# Patient Record
Sex: MALE | Race: WHITE | URBAN - METROPOLITAN AREA
[De-identification: names, ages, dates, MRNs, and addresses within clinical notes are randomized per-mention and may not be internally consistent; named-entity substitution may affect disease eponyms.]

---

## 2018-12-21 ENCOUNTER — INPATIENT (INPATIENT)
Facility: HOSPITAL | Age: 38
LOS: 2 days | Discharge: HOME | End: 2018-12-24
Attending: INTERNAL MEDICINE | Admitting: INTERNAL MEDICINE
Payer: COMMERCIAL

## 2018-12-21 VITALS
DIASTOLIC BLOOD PRESSURE: 93 MMHG | RESPIRATION RATE: 16 BRPM | HEART RATE: 76 BPM | SYSTOLIC BLOOD PRESSURE: 145 MMHG | OXYGEN SATURATION: 97 % | WEIGHT: 190.04 LBS | TEMPERATURE: 96 F

## 2018-12-21 DIAGNOSIS — Z90.49 ACQUIRED ABSENCE OF OTHER SPECIFIED PARTS OF DIGESTIVE TRACT: Chronic | ICD-10-CM

## 2018-12-21 DIAGNOSIS — K56.609 UNSPECIFIED INTESTINAL OBSTRUCTION, UNSPECIFIED AS TO PARTIAL VERSUS COMPLETE OBSTRUCTION: ICD-10-CM

## 2018-12-21 DIAGNOSIS — K50.90 CROHN'S DISEASE, UNSPECIFIED, WITHOUT COMPLICATIONS: ICD-10-CM

## 2018-12-21 LAB
ALBUMIN SERPL ELPH-MCNC: 4.7 G/DL — SIGNIFICANT CHANGE UP (ref 3.5–5.2)
ALP SERPL-CCNC: 97 U/L — SIGNIFICANT CHANGE UP (ref 30–115)
ALT FLD-CCNC: 27 U/L — SIGNIFICANT CHANGE UP (ref 0–41)
ANION GAP SERPL CALC-SCNC: 17 MMOL/L — HIGH (ref 7–14)
AST SERPL-CCNC: 23 U/L — SIGNIFICANT CHANGE UP (ref 0–41)
BILIRUB DIRECT SERPL-MCNC: <0.2 MG/DL — SIGNIFICANT CHANGE UP (ref 0–0.2)
BILIRUB INDIRECT FLD-MCNC: >0.9 MG/DL — SIGNIFICANT CHANGE UP (ref 0.2–1.2)
BILIRUB SERPL-MCNC: 1.1 MG/DL — SIGNIFICANT CHANGE UP (ref 0.2–1.2)
BUN SERPL-MCNC: 15 MG/DL — SIGNIFICANT CHANGE UP (ref 10–20)
CALCIUM SERPL-MCNC: 10.3 MG/DL — HIGH (ref 8.5–10.1)
CHLORIDE SERPL-SCNC: 98 MMOL/L — SIGNIFICANT CHANGE UP (ref 98–110)
CO2 SERPL-SCNC: 24 MMOL/L — SIGNIFICANT CHANGE UP (ref 17–32)
CREAT SERPL-MCNC: 0.9 MG/DL — SIGNIFICANT CHANGE UP (ref 0.7–1.5)
GLUCOSE SERPL-MCNC: 127 MG/DL — HIGH (ref 70–99)
HCT VFR BLD CALC: 46.8 % — SIGNIFICANT CHANGE UP (ref 42–52)
HGB BLD-MCNC: 16.1 G/DL — SIGNIFICANT CHANGE UP (ref 14–18)
LACTATE SERPL-SCNC: 1.6 MMOL/L — SIGNIFICANT CHANGE UP (ref 0.5–2.2)
LIDOCAIN IGE QN: 19 U/L — SIGNIFICANT CHANGE UP (ref 7–60)
MCHC RBC-ENTMCNC: 29.1 PG — SIGNIFICANT CHANGE UP (ref 27–31)
MCHC RBC-ENTMCNC: 34.4 G/DL — SIGNIFICANT CHANGE UP (ref 32–37)
MCV RBC AUTO: 84.6 FL — SIGNIFICANT CHANGE UP (ref 80–94)
NRBC # BLD: 0 /100 WBCS — SIGNIFICANT CHANGE UP (ref 0–0)
PLATELET # BLD AUTO: 269 K/UL — SIGNIFICANT CHANGE UP (ref 130–400)
POTASSIUM SERPL-MCNC: 4.6 MMOL/L — SIGNIFICANT CHANGE UP (ref 3.5–5)
POTASSIUM SERPL-SCNC: 4.6 MMOL/L — SIGNIFICANT CHANGE UP (ref 3.5–5)
PROT SERPL-MCNC: 7.8 G/DL — SIGNIFICANT CHANGE UP (ref 6–8)
RBC # BLD: 5.53 M/UL — SIGNIFICANT CHANGE UP (ref 4.7–6.1)
RBC # FLD: 12.5 % — SIGNIFICANT CHANGE UP (ref 11.5–14.5)
SODIUM SERPL-SCNC: 139 MMOL/L — SIGNIFICANT CHANGE UP (ref 135–146)
WBC # BLD: 10.42 K/UL — SIGNIFICANT CHANGE UP (ref 4.8–10.8)
WBC # FLD AUTO: 10.42 K/UL — SIGNIFICANT CHANGE UP (ref 4.8–10.8)

## 2018-12-21 RX ORDER — ONDANSETRON 8 MG/1
4 TABLET, FILM COATED ORAL ONCE
Qty: 0 | Refills: 0 | Status: COMPLETED | OUTPATIENT
Start: 2018-12-21 | End: 2018-12-21

## 2018-12-21 RX ORDER — PROCHLORPERAZINE MALEATE 5 MG
10 TABLET ORAL ONCE
Qty: 0 | Refills: 0 | Status: COMPLETED | OUTPATIENT
Start: 2018-12-21 | End: 2018-12-21

## 2018-12-21 RX ORDER — HYDROXYZINE HCL 10 MG
50 TABLET ORAL ONCE
Qty: 0 | Refills: 0 | Status: COMPLETED | OUTPATIENT
Start: 2018-12-21 | End: 2018-12-21

## 2018-12-21 RX ORDER — CHLORHEXIDINE GLUCONATE 213 G/1000ML
1 SOLUTION TOPICAL
Qty: 0 | Refills: 0 | Status: DISCONTINUED | OUTPATIENT
Start: 2018-12-21 | End: 2018-12-24

## 2018-12-21 RX ORDER — IOHEXOL 300 MG/ML
30 INJECTION, SOLUTION INTRAVENOUS ONCE
Qty: 0 | Refills: 0 | Status: COMPLETED | OUTPATIENT
Start: 2018-12-21 | End: 2018-12-21

## 2018-12-21 RX ORDER — MORPHINE SULFATE 50 MG/1
4 CAPSULE, EXTENDED RELEASE ORAL ONCE
Qty: 0 | Refills: 0 | Status: DISCONTINUED | OUTPATIENT
Start: 2018-12-21 | End: 2018-12-21

## 2018-12-21 RX ORDER — MORPHINE SULFATE 50 MG/1
4 CAPSULE, EXTENDED RELEASE ORAL EVERY 6 HOURS
Qty: 0 | Refills: 0 | Status: DISCONTINUED | OUTPATIENT
Start: 2018-12-21 | End: 2018-12-24

## 2018-12-21 RX ORDER — MORPHINE SULFATE 50 MG/1
8 CAPSULE, EXTENDED RELEASE ORAL ONCE
Qty: 0 | Refills: 0 | Status: DISCONTINUED | OUTPATIENT
Start: 2018-12-21 | End: 2018-12-21

## 2018-12-21 RX ORDER — SODIUM CHLORIDE 9 MG/ML
1000 INJECTION INTRAMUSCULAR; INTRAVENOUS; SUBCUTANEOUS ONCE
Qty: 0 | Refills: 0 | Status: COMPLETED | OUTPATIENT
Start: 2018-12-21 | End: 2018-12-21

## 2018-12-21 RX ORDER — HEPARIN SODIUM 5000 [USP'U]/ML
5000 INJECTION INTRAVENOUS; SUBCUTANEOUS EVERY 12 HOURS
Qty: 0 | Refills: 0 | Status: DISCONTINUED | OUTPATIENT
Start: 2018-12-21 | End: 2018-12-24

## 2018-12-21 RX ORDER — LIDOCAINE 4 G/100G
10 CREAM TOPICAL ONCE
Qty: 0 | Refills: 0 | Status: COMPLETED | OUTPATIENT
Start: 2018-12-21 | End: 2018-12-21

## 2018-12-21 RX ORDER — BENZOCAINE 10 %
1 GEL (GRAM) MUCOUS MEMBRANE
Qty: 0 | Refills: 0 | Status: DISCONTINUED | OUTPATIENT
Start: 2018-12-21 | End: 2018-12-24

## 2018-12-21 RX ORDER — SODIUM CHLORIDE 9 MG/ML
1000 INJECTION, SOLUTION INTRAVENOUS ONCE
Qty: 0 | Refills: 0 | Status: COMPLETED | OUTPATIENT
Start: 2018-12-21 | End: 2018-12-21

## 2018-12-21 RX ORDER — SODIUM CHLORIDE 9 MG/ML
1000 INJECTION, SOLUTION INTRAVENOUS
Qty: 0 | Refills: 0 | Status: DISCONTINUED | OUTPATIENT
Start: 2018-12-21 | End: 2018-12-24

## 2018-12-21 RX ADMIN — Medication 60 MILLIGRAM(S): at 11:38

## 2018-12-21 RX ADMIN — Medication 1 SPRAY(S): at 23:37

## 2018-12-21 RX ADMIN — ONDANSETRON 4 MILLIGRAM(S): 8 TABLET, FILM COATED ORAL at 04:34

## 2018-12-21 RX ADMIN — LIDOCAINE 10 MILLILITER(S): 4 CREAM TOPICAL at 09:58

## 2018-12-21 RX ADMIN — Medication 10 MILLIGRAM(S): at 12:03

## 2018-12-21 RX ADMIN — ONDANSETRON 4 MILLIGRAM(S): 8 TABLET, FILM COATED ORAL at 09:58

## 2018-12-21 RX ADMIN — Medication 1 SPRAY(S): at 15:54

## 2018-12-21 RX ADMIN — SODIUM CHLORIDE 100 MILLILITER(S): 9 INJECTION, SOLUTION INTRAVENOUS at 14:53

## 2018-12-21 RX ADMIN — ONDANSETRON 4 MILLIGRAM(S): 8 TABLET, FILM COATED ORAL at 08:15

## 2018-12-21 RX ADMIN — MORPHINE SULFATE 4 MILLIGRAM(S): 50 CAPSULE, EXTENDED RELEASE ORAL at 09:59

## 2018-12-21 RX ADMIN — MORPHINE SULFATE 8 MILLIGRAM(S): 50 CAPSULE, EXTENDED RELEASE ORAL at 06:08

## 2018-12-21 RX ADMIN — SODIUM CHLORIDE 1000 MILLILITER(S): 9 INJECTION INTRAMUSCULAR; INTRAVENOUS; SUBCUTANEOUS at 04:34

## 2018-12-21 RX ADMIN — SODIUM CHLORIDE 1000 MILLILITER(S): 9 INJECTION, SOLUTION INTRAVENOUS at 06:07

## 2018-12-21 RX ADMIN — MORPHINE SULFATE 4 MILLIGRAM(S): 50 CAPSULE, EXTENDED RELEASE ORAL at 04:35

## 2018-12-21 RX ADMIN — MORPHINE SULFATE 4 MILLIGRAM(S): 50 CAPSULE, EXTENDED RELEASE ORAL at 09:58

## 2018-12-21 RX ADMIN — MORPHINE SULFATE 4 MILLIGRAM(S): 50 CAPSULE, EXTENDED RELEASE ORAL at 23:37

## 2018-12-21 RX ADMIN — IOHEXOL 30 MILLILITER(S): 300 INJECTION, SOLUTION INTRAVENOUS at 04:34

## 2018-12-21 RX ADMIN — Medication 50 MILLIGRAM(S): at 22:03

## 2018-12-21 NOTE — ED PROVIDER NOTE - ATTENDING CONTRIBUTION TO CARE
I personally evaluated the patient. I reviewed the Resident’s or Physician Assistant’s note (as assigned above), and agree with the findings and plan except as documented in my note.    39yo M pmhx crohns disease, prior sbo with small bowel resection at [place] presents with mid abd pain and nausea since 3 pm. No BBM. No dysuria or hematuria. No flank pain. No active vomiting.     A/P: labs, CT, pain control, IVF fluids, reassess    Patient will be signed out to Dr. Berrios.

## 2018-12-21 NOTE — H&P ADULT - HISTORY OF PRESENT ILLNESS
37yo M pmhx crohns disease, prior sbo with small bowel resection, all doctors at [place] presents cc middle abdominal pain, described as feeling like his prior obstructions, which began abruptly around 3pm associated with nausea and vomiting. no fevers, no diarrhea.

## 2018-12-21 NOTE — ED PROVIDER NOTE - OBJECTIVE STATEMENT
39yo M pmhx crohns disease, prior sbo with small bowel resection, all doctors at [place] presents cc middle abdominal pain, described as feeling like his prior obstructions, which began abruptly around 3pm associated with nausea and vomiting. no fevers, no diarrhea. 37yo M pmhx crohns disease, prior sbo with small bowel resection, all doctors at [place] presents cc middle abdominal pain, described as feeling like his prior obstructions, which began abruptly around 3pm associated with nausea and vomiting. no fevers, no diarrhea. Patient reports pain as severe, 9/10, sharp in nature, associated with nausea and vomiting, and radiating throughout abdomen.

## 2018-12-21 NOTE — H&P ADULT - NSHPPHYSICALEXAM_GEN_ALL_CORE
Vital Signs Last 24 Hrs  T(C): 36.4 (21 Dec 2018 12:31), Max: 36.4 (21 Dec 2018 11:53)  T(F): 97.5 (21 Dec 2018 12:31), Max: 97.5 (21 Dec 2018 11:53)  HR: 66 (21 Dec 2018 12:31) (66 - 76)  BP: 120/71 (21 Dec 2018 12:31) (120/71 - 145/93)  BP(mean): --  RR: 16 (21 Dec 2018 12:31) (16 - 17)  SpO2: 95% (21 Dec 2018 11:53) (95% - 97%)    PHYSICAL EXAM:  GENERAL: NAD, well-groomed, well-developed  HEAD:  Atraumatic, Normocephalic  EYES: EOMI, PERRLA, conjunctiva and sclera clear  NERVOUS SYSTEM:  Alert & Oriented X 4, Good concentration; Motor Strength 5/5 B/L upper and lower extremities; DTRs 2+ intact and symmetric  CHEST/LUNG: Clear to percussion bilaterally; No rales, rhonchi, wheezing, or rubs  HEART: Regular rate and rhythm; No murmurs, rubs, or gallops  ABDOMEN: Soft, +NGT, decreased bowel sounds, mild TTP  EXTREMITIES:  2+ Peripheral Pulses, No clubbing, cyanosis, or edema  SKIN: No rashes or lesions

## 2018-12-21 NOTE — CONSULT NOTE ADULT - ASSESSMENT
SBO, likely related to Crohn's exacerbation    Pt seen and examined with Dr. Jeffers:   - continue NGT to [facility]; monitor output   - NPO   - IV fluids   - GI consult for possible steroids   - will follow

## 2018-12-21 NOTE — ED ADULT NURSE NOTE - NSIMPLEMENTINTERV_GEN_ALL_ED
Implemented All Universal Safety Interventions:  [place] to call system. Call bell, personal items and telephone within reach. Instruct patient to call for assistance. Room bathroom lighting operational. Non-slip footwear when patient is off stretcher. Physically safe environment: no spills, clutter or unnecessary equipment. Stretcher in lowest position, wheels locked, appropriate side rails in place.

## 2018-12-21 NOTE — ED PROVIDER NOTE - CARE PLAN
Principal Discharge DX:	Crohns disease  Secondary Diagnosis:	Small bowel obstruction Principal Discharge DX:	Small bowel obstruction  Secondary Diagnosis:	Crohn's disease of small intestine with intestinal obstruction  Secondary Diagnosis:	Status post small bowel resection

## 2018-12-21 NOTE — CONSULT NOTE ADULT - ASSESSMENT
38yMale pmh of Crohn's disease on Remicade last dose 6 weeks ago, two prior SBOs with 2 prior Small bowel resections one 21 years ago and one 12 years ago had abdominal pain that started in the left lower quadrant that migrated to the left upper quadrant and pain is now periumbilical. NG tube +300ccs output brown content in one hour    Problem-1 Small Bowel Obstruction   -NG Tube Decompression  -IV Hydration  -Will discuss further with attending    Problem-2 Crohn's Disease   -Patient needs colonoscopy outpatient for surveillance of Crohn's Disease 38yMale pmh of Crohn's disease on Remicade last dose 6 weeks ago, two prior SBOs with 2 prior Small bowel resections one 21 years ago and one 12 years ago had abdominal pain that started in the left lower quadrant that migrated to the left upper quadrant and pain is now periumbilical. NG tube +300ccs output brown content in one hour    Problem-1 Small Bowel Obstruction   -NG Tube Decompression  -IV Hydration      Problem-2 History of Crohn's Disease with possible Flare up  -Methylprednisolone 60mg daily until patient has improvement in current symptoms. Then patient can be switched to Prednisone 40mg with rapid taper for 4 weeks 38yMale pmh of Crohn's disease on Remicade last dose 6 weeks ago, two prior SBOs with 2 prior Small bowel resections one 21 years ago and one 12 years ago had abdominal pain that started in the left lower quadrant that migrated to the left upper quadrant and pain is now periumbilical. NG tube +300ccs output brown content in one hour    Problem-1 Small Bowel Obstruction   -NG Tube Decompression  -IV Hydration  -Daily Abdominal X-Rays      Problem-2 History of Crohn's Disease with possible Flare up  -Methylprednisolone 60mg daily until patient has improvement in current symptoms. Then patient can be switched to Prednisone 40mg with rapid taper for 4 weeks 38yMale pmh of Crohn's disease on Remicade last dose 6 weeks ago, two prior SBOs with 2 prior Small bowel resections one 21 years ago and one 12 years ago had abdominal pain that started in the left lower quadrant that migrated to the left upper quadrant and pain is now periumbilical. NG tube +300ccs output brown content in one hour    Problem-1 Small Bowel Obstruction in a patient with Crohn's and a history of small bowel resection   Inflammatory vs. fibrotic stricture vs. adhesions  -NG Tube Decompression  -IV Hydration  -Daily Abdominal X-Rays  -Methylprednisolone 60mg daily until patient has improvement in current symptoms (normal Xray, reduced NG drainage, no pain, + flatus  + BM) . Then patient can be switched to Prednisone 40mg with  taper over 4 weeks  - If the patient worsens he will be transferred to [facility]

## 2018-12-21 NOTE — ED PROVIDER NOTE - PROGRESS NOTE DETAILS
DW surg PA. OTW to see patient REMI surgery PA and Dr. Skinner. NG tube placed. States no surgical intervention, blockage liekly due to inflammation. REMI Martini Will see patient. signed out to Dr. Ramesh

## 2018-12-21 NOTE — ED PROVIDER NOTE - MEDICAL DECISION MAKING DETAILS
CT scan shows small bowel obstruction.  Surgery consulted. Pain control given and NG tube placed by Dr. Skinner of surgery. ED work up reviewed.  Will treat Crohn's with steroids, fluids. PAtient to be amditted to medicine with GI and surgery evaluations/follow up.  Patient and mother spoken to in detail about results and plan of care.

## 2018-12-21 NOTE — ED PROVIDER NOTE - NS ED ROS FT
General: No fevers, +chills, +nausea, +vomiting  Eyes:  No visual changes, eye pain or discharge.  ENMT:  No hearing changes, pain  Cardiac:  No chest pain,, no edema.  Respiratory:  No cough or respiratory distress.   GI:  + nausea, +vomiting, no diarrhea, +abdominal pain.  :  No dysuria  MS:  No back pain.  Neuro:   No LOC.  Skin:  No skin rash.   Endocrine: No history of thyroid disease or diabetes.

## 2018-12-21 NOTE — H&P ADULT - NSHPLABSRESULTS_GEN_ALL_CORE
16.1   10.42 )-----------( 269      ( 21 Dec 2018 04:05 )             46.8   12-21    139  |  98  |  15  ----------------------------<  127<H>  4.6   |  24  |  0.9    Ca    10.3<H>      21 Dec 2018 04:05    TPro  7.8  /  Alb  4.7  /  TBili  1.1  /  DBili  <0.2  /  AST  23  /  ALT  27  /  AlkPhos  97  12-21      < from: CT Abdomen and Pelvis w/ Oral Cont and w/ IV Cont (12.21.18 @ 07:36) >    IMPRESSION:        Multiple dilated fluid-filled loops of small bowel with evidence of   fecalization measuring up to 4.2 cm within the mid abdomen with apparent   transition point in the right lower quadrant consistent with small bowel   obstruction.    Small abdominopelvic ascites.     < end of copied text >

## 2018-12-21 NOTE — H&P ADULT - ASSESSMENT
37yo M pmhx crohns disease, prior sbo with small bowel resection, all doctors at [place] presents cc middle abdominal pain, described as feeling like his prior obstructions, which began abruptly around 3pm associated with nausea and vomiting. no fevers, no diarrhea.

## 2018-12-21 NOTE — CONSULT NOTE ADULT - SUBJECTIVE AND OBJECTIVE BOX
Chief complaint/Reason for consult: SBO and History of Crohn's Disease    HPI:38yMale pmh of Crohn's disease on Remicade last dose 6 weeks ago, two prior SBOs with 2 prior Small bowel resections one 21 years ago and one 12 years ago had abdominal pain that started in the left lower quadrant that migrated to the left upper quadrant and pain is now periumbilical. Patient states he received morphine prior so he notes he is currently having more abdominal discomfort than abdominal pain. Patient notes he has been having nausea and vomiting since 3pm yesterday as well. 6 episodes of unprompted vomiting and one self induced episode of vomiting due to abdominal discomfort. Patient is on Remicade every 8 weeks and patient's last dose was 6 weeks ago. Patient's last colonoscopy was 8 years ago.     PMHX/PSHX:  PAST MEDICAL & SURGICAL HISTORY:  Crohns disease  Two Small Bowel Resections one 21 years ago and one 12 years ago  Appendectomy 12 years ago  Cholecystectomy 12 years ago    Family history:  FAMILY HISTORY:    No GI cancers in first or second degree relatives    Social History: No smoking. No alcohol. No illegal drug use.    Allergies:  lactose  No Known Drug Allergies      MEDICATIONS:Home Medications:  Remicade:  (21 Dec 2018 04:01)    MEDICATIONS  (STANDING):  prochlorperazine   Injectable 10 milliGRAM(s) IV Push once        REVIEW OF SYSTEMS  General:  No weight loss, fevers, +chills.  Eyes:  No reported pain or visual changes  ENT:  No sore throat or runny nose.  NECK: No stiffness or lymphadenopathy  CV:  No chest pain or palpitations.  Resp:  No shortness of breath, cough, wheezing or hemoptysis  GI:  +Periumbilical abdominal pain, +nausea, +vomiting, No dysphagia, diarrhea, No rectal bleeding, melena, or hematemesis.  Muscle:  No aches or weakness  Neuro:  No tingling, numbness       VITALS:   T(F): 97.5 (12-21-18 @ 11:53), Max: 97.5 (12-21-18 @ 11:53)  HR: 72 (12-21-18 @ 11:53) (72 - 76)  BP: 136/79 (12-21-18 @ 11:53) (127/84 - 145/93)  RR: 17 (12-21-18 @ 11:53) (16 - 17)  SpO2: 95% (12-21-18 @ 11:53) (95% - 97%)    PHYSICAL EXAM:  GENERAL: AAOx3, no acute distress +NG Tube in place produced 300cc output in one hour  HEAD:  Atraumatic, Normocephalic  EYES: conjunctiva and sclera clear  NECK: Supple, No thyromegaly   CHEST/LUNG: Clear to auscultation bilaterally; No wheeze, rhonchi, or rales  HEART: Regular rate and rhythm; normal S1, S2, No murmurs.  ABDOMEN: Soft, nontender, nondistended; Bowel sounds present, no abdominal bruit, masses, or hepatosplenomegaly +surgical abdominal scars noted  EXTREMITIES:  2+ Peripheral Pulses. No clubbing, cyanosis, or edema  NEUROLOGY: No asterixis or tremor  SKIN: Intact, no jaundice  EXTREMITIES: warm, no periph edema.        LABS:  12-21    139  |  98  |  15  ----------------------------<  127<H>  4.6   |  24  |  0.9    Ca    10.3<H>      21 Dec 2018 04:05    TPro  7.8  /  Alb  4.7  /  TBili  1.1  /  DBili  <0.2  /  AST  23  /  ALT  27  /  AlkPhos  97  12-21                          16.1   10.42 )-----------( 269      ( 21 Dec 2018 04:05 )             46.8     LIVER FUNCTIONS - ( 21 Dec 2018 04:05 )  Alb: 4.7 g/dL / Pro: 7.8 g/dL / ALK PHOS: 97 U/L / ALT: 27 U/L / AST: 23 U/L / GGT: x               IMAGING:  < from: CT Abdomen and Pelvis w/ Oral Cont and w/ IV Cont (12.21.18 @ 07:36) >  EXAM:  CT ABDOMEN AND PELVIS OC IC            PROCEDURE DATE:  12/21/2018            INTERPRETATION:  CLINICAL STATEMENT: Abdominal pain. History of Crohn's   disease.      TECHNIQUE: Contiguous axial CT images were obtained from the lower chest   to the pubic symphysis following administration of 95 cc Optiray 320   intravenous contrast. 5 cc discarded.  Oral contrast was administered.    Reformatted images in the coronal and sagittal planes were acquired.    COMPARISON CT: None.      FINDINGS:    LOWER CHEST: Bibasilar subsegmental atelectasis.    HEPATOBILIARY: Few scattered subcentimeter hypodensities too small to   further characterize. The gallbladder is present.    SPLEEN: Unremarkable.    PANCREAS: Unremarkable.    ADRENAL GLANDS: Unremarkable.    KIDNEYS: Symmetric enhancement bilaterally. No hydronephrosis.   Nonobstructing 0.7 x 0.5 x 0.5 cm calculus at the left midpole.    ABDOMINOPELVIC NODES: No lymphadenopathy.    PELVIC ORGANS: Unremarkable.    PERITONEUM/MESENTERY/BOWEL: Multiple dilated fluid-filled loops of small   bowel with evidence of fecalization measuring up to 4.2 cm within the mid   abdomen with apparent transition point in the right lower quadrant   (series 4/279) consistent with small bowel obstruction. The large bowel   is collapsed. Post partial bowel resection with anastomosis seen in the   right lower quadrant. Small abdominopelvic ascites. No evidence of free   air.    BONES/SOFT TISSUES: Unremarkable for age.      IMPRESSION:        Multiple dilated fluid-filled loops of small bowel with evidence of   fecalization measuring up to 4.2 cm within the mid abdomen with apparent   transition point in the right lower quadrant consistent with small bowel   obstruction.    Small abdominopelvic ascites.               SARITA BUENO M.D., RESIDENT RADIOLOGIST  This document has been electronically signed.  ALYSIA PATTON M.D., ATTENDING RADIOLOGIST  This document has been electronically signed. Dec 21 2018  8:55AM    < end of copied text > Chief complaint/Reason for consult: SBO and History of Crohn's Disease    HPI:38yMale pmh of Crohn's disease on Remicade last dose 6 weeks ago, two prior SBOs with 2 prior Small bowel resections one 21 years ago and one 12 years ago had abdominal pain that started in the left lower quadrant that migrated to the left upper quadrant and pain is now periumbilical. Patient states he received morphine prior so he notes he is currently having more abdominal discomfort than abdominal pain. Patient notes he has been having nausea and vomiting since 3pm yesterday as well. 6 episodes of unprompted vomiting and one self induced episode of vomiting due to abdominal discomfort. Patient is on Remicade every 8 weeks and patient's last dose was 6 weeks ago. Patient's last colonoscopy was 8 years ago.     The patient had a small amount of gas and a small bowel movement and has not required morphine for 8 hours    He states that prior episode of small bowel obstruction responded rapidly to steroids      Patient is under the care of Adilia Lomax at [place].     PMHX/PSHX:  PAST MEDICAL & SURGICAL HISTORY:  Crohns disease  Two Small Bowel Resections one 21 years ago and one 12 years ago  Appendectomy 12 years ago  Cholecystectomy 12 years ago    Family history:     No GI cancers in first or second degree relatives    Social History: No smoking. No alcohol. No illegal drug use.    Allergies:  lactose  No Known Drug Allergies      MEDICATIONS:Home Medications:  Remicade:  (21 Dec 2018 04:01)    MEDICATIONS  (STANDING):  prochlorperazine   Injectable 10 milliGRAM(s) IV Push once        REVIEW OF SYSTEMS  General:  No weight loss, fevers, +chills.  Eyes:  No reported pain or visual changes  ENT:  No sore throat or runny nose.  NECK: No stiffness or lymphadenopathy  CV:  No chest pain or palpitations.  Resp:  No shortness of breath, cough, wheezing or hemoptysis  GI:  +Periumbilical abdominal pain, +nausea, +vomiting, No dysphagia, diarrhea, No rectal bleeding, melena, or hematemesis.  Muscle:  No aches or weakness  Neuro:  No tingling, numbness       VITALS:   T(F): 97.5 (12-21-18 @ 11:53), Max: 97.5 (12-21-18 @ 11:53)  HR: 72 (12-21-18 @ 11:53) (72 - 76)  BP: 136/79 (12-21-18 @ 11:53) (127/84 - 145/93)  RR: 17 (12-21-18 @ 11:53) (16 - 17)  SpO2: 95% (12-21-18 @ 11:53) (95% - 97%)    PHYSICAL EXAM:  GENERAL: AAOx3, no acute distress +NG Tube in place produced 300cc output in one hour  HEAD:  Atraumatic, Normocephalic  EYES: conjunctiva and sclera clear  NECK: Supple, No thyromegaly   CHEST/LUNG: Clear to auscultation bilaterally; No wheeze, rhonchi, or rales  HEART: Regular rate and rhythm; normal S1, S2, No murmurs.  ABDOMEN: Soft, nontender, nondistended; Bowel sounds present, no abdominal bruit, masses, or hepatosplenomegaly +surgical abdominal scars noted  EXTREMITIES:  2+ Peripheral Pulses. No clubbing, cyanosis, or edema  NEUROLOGY: No asterixis or tremor  SKIN: Intact, no jaundice  EXTREMITIES: warm, no periph edema.        LABS:  12-21    139  |  98  |  15  ----------------------------<  127<H>  4.6   |  24  |  0.9    Ca    10.3<H>      21 Dec 2018 04:05    TPro  7.8  /  Alb  4.7  /  TBili  1.1  /  DBili  <0.2  /  AST  23  /  ALT  27  /  AlkPhos  97  12-21                          16.1   10.42 )-----------( 269      ( 21 Dec 2018 04:05 )             46.8     LIVER FUNCTIONS - ( 21 Dec 2018 04:05 )  Alb: 4.7 g/dL / Pro: 7.8 g/dL / ALK PHOS: 97 U/L / ALT: 27 U/L / AST: 23 U/L / GGT: x               IMAGING:  < from: CT Abdomen and Pelvis w/ Oral Cont and w/ IV Cont (12.21.18 @ 07:36) >  EXAM:  CT ABDOMEN AND PELVIS OC IC            PROCEDURE DATE:  12/21/2018            INTERPRETATION:  CLINICAL STATEMENT: Abdominal pain. History of Crohn's   disease.      TECHNIQUE: Contiguous axial CT images were obtained from the lower chest   to the pubic symphysis following administration of 95 cc Optiray 320   intravenous contrast. 5 cc discarded.  Oral contrast was administered.    Reformatted images in the coronal and sagittal planes were acquired.    COMPARISON CT: None.      FINDINGS:    LOWER CHEST: Bibasilar subsegmental atelectasis.    HEPATOBILIARY: Few scattered subcentimeter hypodensities too small to   further characterize. The gallbladder is present.    SPLEEN: Unremarkable.    PANCREAS: Unremarkable.    ADRENAL GLANDS: Unremarkable.    KIDNEYS: Symmetric enhancement bilaterally. No hydronephrosis.   Nonobstructing 0.7 x 0.5 x 0.5 cm calculus at the left midpole.    ABDOMINOPELVIC NODES: No lymphadenopathy.    PELVIC ORGANS: Unremarkable.    PERITONEUM/MESENTERY/BOWEL: Multiple dilated fluid-filled loops of small   bowel with evidence of fecalization measuring up to 4.2 cm within the mid   abdomen with apparent transition point in the right lower quadrant   (series 4/279) consistent with small bowel obstruction. The large bowel   is collapsed. Post partial bowel resection with anastomosis seen in the   right lower quadrant. Small abdominopelvic ascites. No evidence of free   air.    BONES/SOFT TISSUES: Unremarkable for age.      IMPRESSION:        Multiple dilated fluid-filled loops of small bowel with evidence of   fecalization measuring up to 4.2 cm within the mid abdomen with apparent   transition point in the right lower quadrant consistent with small bowel   obstruction.    Small abdominopelvic ascites.               SARITA BUENO M.D., RESIDENT RADIOLOGIST  This document has been electronically signed.  ALYSIA PATTON M.D., ATTENDING RADIOLOGIST  This document has been electronically signed. Dec 21 2018  8:55AM    < end of copied text >

## 2018-12-21 NOTE — ED PROVIDER NOTE - PHYSICAL EXAMINATION
CONSTITUTIONAL: Well-developed; well-nourished; appears uncomfortable, speaking in full sentences  SKIN: warm, dry  HEAD: Normocephalic; atraumatic  EYES: PERRL, EOMI, no conjunctival erythema  ENT: No nasal discharge; airway clear, mucous membranes moist  NECK: Supple; non tender, FROM  CARD: +S1, S2 no murmurs, gallops, or rubs. Regular rate and rhythm. radial 2+  RESP: No wheezes, rales or rhonchi. CTABL  ABD: soft, diffusely tender to palpation, no rebound, no guarding, no rigidity, neg murphys  EXT: moves all extremities, ambulates wo assistance No clubbing, cyanosis or edema.   NEURO: Alert, oriented, grossly unremarkable, no focal deficits, cn ii-xii grossly intact  PSYCH: Cooperative, appropriate

## 2018-12-21 NOTE — CONSULT NOTE ADULT - ATTENDING COMMENTS
pt examined  no abdominal pain. no abdominal tenderness. History of Crohn's disease.   History of  Small bowel resection twice before at [facility].   Is on Remicade at [facility].    NG tube inserted over 250ml bilious output.  CT scan reviewed.    Impression: Small bowel obstruction proximal to the staple lines.   Severely inflamed small bowel with proximal fecalization.  All these likely from Crohn's disease.

## 2018-12-21 NOTE — ED PROVIDER NOTE - SECONDARY DIAGNOSIS.
Small bowel obstruction Crohn's disease of small intestine with intestinal obstruction Status post small bowel resection

## 2018-12-22 LAB
ANION GAP SERPL CALC-SCNC: 11 MMOL/L — SIGNIFICANT CHANGE UP (ref 7–14)
BUN SERPL-MCNC: 12 MG/DL — SIGNIFICANT CHANGE UP (ref 10–20)
CALCIUM SERPL-MCNC: 8.7 MG/DL — SIGNIFICANT CHANGE UP (ref 8.5–10.1)
CHLORIDE SERPL-SCNC: 104 MMOL/L — SIGNIFICANT CHANGE UP (ref 98–110)
CO2 SERPL-SCNC: 27 MMOL/L — SIGNIFICANT CHANGE UP (ref 17–32)
CREAT SERPL-MCNC: 0.9 MG/DL — SIGNIFICANT CHANGE UP (ref 0.7–1.5)
GLUCOSE SERPL-MCNC: 126 MG/DL — HIGH (ref 70–99)
HCT VFR BLD CALC: 41.2 % — LOW (ref 42–52)
HGB BLD-MCNC: 13.9 G/DL — LOW (ref 14–18)
MCHC RBC-ENTMCNC: 29.4 PG — SIGNIFICANT CHANGE UP (ref 27–31)
MCHC RBC-ENTMCNC: 33.7 G/DL — SIGNIFICANT CHANGE UP (ref 32–37)
MCV RBC AUTO: 87.3 FL — SIGNIFICANT CHANGE UP (ref 80–94)
NRBC # BLD: 0 /100 WBCS — SIGNIFICANT CHANGE UP (ref 0–0)
PLATELET # BLD AUTO: 232 K/UL — SIGNIFICANT CHANGE UP (ref 130–400)
POTASSIUM SERPL-MCNC: 4.4 MMOL/L — SIGNIFICANT CHANGE UP (ref 3.5–5)
POTASSIUM SERPL-SCNC: 4.4 MMOL/L — SIGNIFICANT CHANGE UP (ref 3.5–5)
RBC # BLD: 4.72 M/UL — SIGNIFICANT CHANGE UP (ref 4.7–6.1)
RBC # FLD: 12.7 % — SIGNIFICANT CHANGE UP (ref 11.5–14.5)
SODIUM SERPL-SCNC: 142 MMOL/L — SIGNIFICANT CHANGE UP (ref 135–146)
WBC # BLD: 9.96 K/UL — SIGNIFICANT CHANGE UP (ref 4.8–10.8)
WBC # FLD AUTO: 9.96 K/UL — SIGNIFICANT CHANGE UP (ref 4.8–10.8)

## 2018-12-22 RX ORDER — HYDROXYZINE HCL 10 MG
25 TABLET ORAL EVERY 6 HOURS
Qty: 0 | Refills: 0 | Status: DISCONTINUED | OUTPATIENT
Start: 2018-12-22 | End: 2018-12-24

## 2018-12-22 RX ORDER — PANTOPRAZOLE SODIUM 20 MG/1
40 TABLET, DELAYED RELEASE ORAL DAILY
Qty: 0 | Refills: 0 | Status: DISCONTINUED | OUTPATIENT
Start: 2018-12-23 | End: 2018-12-24

## 2018-12-22 RX ORDER — HYDROXYZINE HCL 10 MG
50 TABLET ORAL EVERY 8 HOURS
Qty: 0 | Refills: 0 | Status: DISCONTINUED | OUTPATIENT
Start: 2018-12-22 | End: 2018-12-22

## 2018-12-22 RX ORDER — HYDROXYZINE HCL 10 MG
25 TABLET ORAL THREE TIMES A DAY
Qty: 0 | Refills: 0 | Status: DISCONTINUED | OUTPATIENT
Start: 2018-12-22 | End: 2018-12-22

## 2018-12-22 RX ORDER — PANTOPRAZOLE SODIUM 20 MG/1
40 TABLET, DELAYED RELEASE ORAL ONCE
Qty: 0 | Refills: 0 | Status: COMPLETED | OUTPATIENT
Start: 2018-12-22 | End: 2018-12-22

## 2018-12-22 RX ORDER — BENZOCAINE AND MENTHOL 5; 1 G/100ML; G/100ML
1 LIQUID ORAL EVERY 4 HOURS
Qty: 0 | Refills: 0 | Status: DISCONTINUED | OUTPATIENT
Start: 2018-12-22 | End: 2018-12-24

## 2018-12-22 RX ORDER — ONDANSETRON 8 MG/1
4 TABLET, FILM COATED ORAL EVERY 6 HOURS
Qty: 0 | Refills: 0 | Status: DISCONTINUED | OUTPATIENT
Start: 2018-12-22 | End: 2018-12-24

## 2018-12-22 RX ORDER — HYDROXYZINE HCL 10 MG
50 TABLET ORAL AT BEDTIME
Qty: 0 | Refills: 0 | Status: DISCONTINUED | OUTPATIENT
Start: 2018-12-22 | End: 2018-12-24

## 2018-12-22 RX ADMIN — Medication 25 MILLIGRAM(S): at 14:54

## 2018-12-22 RX ADMIN — Medication 60 MILLIGRAM(S): at 06:07

## 2018-12-22 RX ADMIN — PANTOPRAZOLE SODIUM 40 MILLIGRAM(S): 20 TABLET, DELAYED RELEASE ORAL at 17:04

## 2018-12-22 RX ADMIN — ONDANSETRON 4 MILLIGRAM(S): 8 TABLET, FILM COATED ORAL at 14:47

## 2018-12-22 RX ADMIN — Medication 50 MILLIGRAM(S): at 23:04

## 2018-12-22 RX ADMIN — MORPHINE SULFATE 4 MILLIGRAM(S): 50 CAPSULE, EXTENDED RELEASE ORAL at 00:00

## 2018-12-22 RX ADMIN — SODIUM CHLORIDE 100 MILLILITER(S): 9 INJECTION, SOLUTION INTRAVENOUS at 06:09

## 2018-12-22 NOTE — CHART NOTE - NSCHARTNOTEFT_GEN_A_CORE
Spoke with pharmacy: pt started on protonix 2/2 NGt in place/GI stress. Pt on remicade: spoke with pharmacy: no known drug-drug interaction between the two; will continue protonix

## 2018-12-22 NOTE — CHART NOTE - NSCHARTNOTEFT_GEN_A_CORE
Pt states he had some flatus and BM today.  NGT output noted to be a little bloody - will give protonix IV now and then daily.    Continue NGT and F/U obstructive series in AM

## 2018-12-22 NOTE — PROGRESS NOTE ADULT - SUBJECTIVE AND OBJECTIVE BOX
S; Pt with less abdominal pain/distention; no flatus/Bm yet. Has been ambulating. Had 1+ liter output in NGT overnight (denies ice chips/water)  O; Vital Signs Last 24 Hrs  T(C): 36.6 (22 Dec 2018 06:09), Max: 36.8 (21 Dec 2018 22:51)  T(F): 97.8 (22 Dec 2018 06:09), Max: 98.2 (21 Dec 2018 22:51)  HR: 77 (22 Dec 2018 06:09) (66 - 77)  BP: 121/81 (22 Dec 2018 06:09) (120/71 - 136/79)  BP(mean): --  RR: 16 (22 Dec 2018 06:09) (16 - 17)  SpO2: 95% (21 Dec 2018 11:53) (95% - 95%)    I&O's Detail    21 Dec 2018 07:01  -  22 Dec 2018 07:00  --------------------------------------------------------  IN:  Total IN: 0 mL    OUT:    Nasoenteral Tube: 1250 mL (bilious)  Total OUT: 1250 mL    Total NET: -1250 mL          EXAM:  Abd: soft, nontender, no BS appreciated    Labs:                        13.9   9.96  )-----------( 232      ( 22 Dec 2018 08:56 )             41.2         12-22    142  |  104  |  12  ----------------------------<  126<H>  4.4   |  27  |  0.9      Calcium, Total Serum: 8.7 mg/dL (12-22-18 @ 08:56)      RADIOLOGY:    Obs series: pending results (viewed with surgical resident - still with some air fluid levels/dilitation)

## 2018-12-22 NOTE — PROGRESS NOTE ADULT - ASSESSMENT
Patient is a 37yo Male with PMHx Crohn's disease being followed at [facility], on Remicade [id number], last dose 6 weeks ago, prior SBO s/p 2 small bowel resection (21 years ago and one 12 years ago) presented with complaints of Abdominal pain. Pain was described as severe, cramping and sharp & similar to his prior obstructions. It was of sudden onset, initially in the left sided then generalized, associated with nausea and vomiting. He denied any fevers or chills. Last BM and flatus was a day PTP. Patient's last colonoscopy was 8 years ago. CT done showed SBO. He was admitted for further evaluation.         Assessment and Plan:    1. h/o Crohn's disease with SBO:  Obstruction due to Inflammation vs. fibrotic stricture vs. adhesions  CT: Multiple dilated fluid-filled loops of small bowel with evidence of fecalization measuring up to 4.2 cm within the mid abdomen with apparent transition point in the right lower quadrant consistent with small bowel obstruction.  Surgery following: Continue NPO, IV fluids, NGT with suction. Follow up Obstructive series in the AM.  GI following: IV Methylprednisolone 60mg daily until patient has improvement in current symptoms (normal X-ray reduced NG drainage, no pain, + flatus  + BM) .   Then patient can be switched to Prednisone 40mg with taper over 4 weeks  Transfer to [place] if patient fails to improve by tomorrow morning.   Pain control.   Protonix IV.       2. Anxiety:  Continue Vistaril prn.        DVT prophylaxis: Heparin

## 2018-12-22 NOTE — PROGRESS NOTE ADULT - ASSESSMENT
38yMale pmh of Crohn's disease on Remicade last dose 6 weeks ago, two prior SBOs with 2 prior Small bowel resections one 21 years ago and one 12 years ago had abdominal pain that started in the left lower quadrant that migrated to the left upper quadrant and pain is now periumbilical. NG tube output: 1 liter overnight    Problem-1 Small Bowel Obstruction in a patient with Crohn's and a history of small bowel resection   Inflammatory vs. fibrotic stricture vs. adhesions  Patient still seems to be obstructed  -NG Tube Decompression  -IV Hydration  -Daily Abdominal X-Rays (follow up today's film  -Methylprednisolone 60mg daily until patient has improvement in current symptoms (normal Xray, reduced NG drainage, no pain, + flatus  + BM) . Then patient can be switched to Prednisone 40mg with  taper over 4 weeks  - If the patient fails to improve by tomorrow morning, please contact [facility] to arrange for a transfer

## 2018-12-22 NOTE — PROGRESS NOTE ADULT - ASSESSMENT
SBO    1. Continue NGT, NPO, IV fluids  2. may have cepacol lozenges/hard candy for throat irritation  3. F/U obstructive series in AM  4. Continue ambulation

## 2018-12-22 NOTE — PROGRESS NOTE ADULT - SUBJECTIVE AND OBJECTIVE BOX
CHUCHO LYN  38y  Male    Patient is a 38y old  Male who presents with a chief complaint of Abdominal pain (22 Dec 2018 11:30)      INTERVAL HPI/OVERNIGHT EVENTS:  No interval events. Patient still with NG tube on suction.  Abdominal pain and distention improved. Patient complaining of Anxiety and requesting anxiolytic.  No BM or flatus.      REVIEW OF SYSTEMS:  CONSTITUTIONAL: No fever, weight loss, or fatigue  EYES: No eye pain, visual disturbances, or discharge  ENMT:  No difficulty hearing, tinnitus, vertigo; No sinus or throat pain  NECK: No pain or stiffness  BREASTS: No pain, masses, or nipple discharge  RESPIRATORY: No cough, wheezing, chills or hemoptysis; No shortness of breath  CARDIOVASCULAR: No chest pain, palpitations, dizziness, or leg swelling  GASTROINTESTINAL: + abdominal pain. + nausea, No vomiting, or hematemesis; No diarrhea or constipation. No melena or hematochezia.  GENITOURINARY: No dysuria, frequency, hematuria, or incontinence  NEUROLOGICAL: No headaches, memory loss, loss of strength, numbness, or tremors  SKIN: No itching, burning, rashes, or lesions   LYMPH NODES: No enlarged glands  ENDOCRINE: No heat or cold intolerance; No hair loss  MUSCULOSKELETAL: No joint pain or swelling; No muscle, back, or extremity pain  PSYCHIATRIC: No depression, anxiety, mood swings, or difficulty sleeping  HEME/LYMPH: No easy bruising, or bleeding gums  ALLERGY AND IMMUNOLOGIC: No hives or eczema    VITALS:  T(F): 98.8 (12-22-18 @ 14:39), Max: 98.8 (12-22-18 @ 14:39)  HR: 76 (12-22-18 @ 14:39) (66 - 77)  BP: 142/81 (12-22-18 @ 14:39) (121/81 - 142/81)  RR: 16 (12-22-18 @ 14:39) (16 - 16)  SpO2: --        I&O's Summary    21 Dec 2018 07:01  -  22 Dec 2018 07:00  --------------------------------------------------------  IN: 0 mL / OUT: 1250 mL / NET: -1250 mL      PHYSICAL EXAM:  GENERAL: NAD  HEAD:  Atraumatic, Normocephalic  EYES: conjunctiva and sclera clear  ENMT: Moist mucous membranes, NG tube in place  NECK: Supple, Normal thyroid  NERVOUS SYSTEM:  Alert & Oriented X 3, Good concentration; Motor Strength 5/5 B/L upper and lower extremities  CHEST/LUNG: Clear to auscultation bilaterally; No rales, rhonchi, wheezing, or rubs  HEART: Regular rate and rhythm; No murmurs, rubs, or gallops  ABDOMEN: Soft, mild lower abdominal tenderness, Nondistended; Bowel sounds present, well healed incisional scars.  EXTREMITIES:  2+ Peripheral Pulses, No clubbing, cyanosis, or edema  LYMPH: No lymphadenopathy noted  SKIN: No rashes or lesions    Consultant(s) Notes Reviewed:  [x ] YES  [ ] NO  Care Discussed with Consultants/Other Providers [ x] YES  [ ] NO    LABS:                        13.9   9.96  )-----------( 232      ( 22 Dec 2018 08:56 )             41.2     12-22    142  |  104  |  12  ----------------------------<  126<H>  4.4   |  27  |  0.9    Ca    8.7      22 Dec 2018 08:56    TPro  7.8  /  Alb  4.7  /  TBili  1.1  /  DBili  <0.2  /  AST  23  /  ALT  27  /  AlkPhos  97  12-21      MICROBIOLOGY: None      RADIOLOGY & ADDITIONAL TESTS:  < from: Xray Chest 1 View-PORTABLE IMMEDIATE (12.21.18 @ 11:03) >  Support devices: Recently placed enteric tube is in satisfactory   position, terminating overlying the expected region of the stomach in   left upper abdomen    Cardiac/mediastinum/hilum: Unchanged    Lung parenchyma/Pleura: There is no evidence of consolidation, effusion   or pneumothorax.    Skeleton/soft tissues: Stable    Impression:      No radiographic evidence of acute cardiopulmonary disease.      CT Abdomen and Pelvis w/ Oral Cont and w/ IV Cont (12.21.18 @ 07:36)   Multiple dilated fluid-filled loops of small bowel with evidence of   fecalization measuring up to 4.2 cm within the mid abdomen with apparent   transition point in the right lower quadrant consistent with small bowel   obstruction.    Small abdominopelvic ascites.         Imaging Personally Reviewed:  [x] YES  [ ] NO    MEDICATIONS  (STANDING):  chlorhexidine 4% Liquid 1 Application(s) Topical <User Schedule>  dextrose 5% + sodium chloride 0.9%. 1000 milliLiter(s) (100 mL/Hr) IV Continuous <Continuous>  heparin  Injectable 5000 Unit(s) SubCutaneous every 12 hours  methylPREDNISolone sodium succinate Injectable 60 milliGRAM(s) IV Push daily  pantoprazole  Injectable 40 milliGRAM(s) IV Push once    MEDICATIONS  (PRN):  benzocaine 15 mG/menthol 3.6 mG (Sugar-Free) Lozenge 1 Lozenge Oral every 4 hours PRN Sore Throat  benzocaine 20% Spray 1 Spray(s) Topical two times a day PRN throat irritation  hydrOXYzine hydrochloride 25 milliGRAM(s) Oral three times a day PRN Anxiety  morphine  - Injectable 4 milliGRAM(s) IV Push every 6 hours PRN Severe Pain (7 - 10)  ondansetron Injectable 4 milliGRAM(s) IV Push every 6 hours PRN Nausea and/or Vomiting      HEALTH ISSUES - PROBLEM Dx:  Small bowel obstruction  Crohn's disease

## 2018-12-22 NOTE — PROGRESS NOTE ADULT - SUBJECTIVE AND OBJECTIVE BOX
GI Followup Note:   Pt seen and examined at bedside.   38y year old  Male seen  by GI  for : SBO and History of Crohn's Disease        Subjective: 1 liter NGT output overnight. reduced abdominal distention. No flatus or BM since yesterday      REVIEW OF SYSTEMS:  General:  No weight loss, fevers, +chills.  Eyes:  No reported pain or visual changes  ENT:  No sore throat or runny nose.  NECK: No stiffness or lymphadenopathy  CV:  No chest pain or palpitations.  Resp:  No shortness of breath, cough, wheezing or hemoptysis  GI:  +Periumbilical abdominal pain, +nausea, +vomiting, No dysphagia, diarrhea, No rectal bleeding, melena, or hematemesis.  Muscle:  No aches or weakness  Neuro:  No tingling, numbness       Allergies: lactose (Hives)  No Known Drug Allergies      Medications:   benzocaine 15 mG/menthol 3.6 mG (Sugar-Free) Lozenge 1 Lozenge Oral every 4 hours PRN  benzocaine 20% Spray 1 Spray(s) Topical two times a day PRN  chlorhexidine 4% Liquid 1 Application(s) Topical <User Schedule>  dextrose 5% + sodium chloride 0.9%. 1000 milliLiter(s) IV Continuous <Continuous>  heparin  Injectable 5000 Unit(s) SubCutaneous every 12 hours  methylPREDNISolone sodium succinate Injectable 60 milliGRAM(s) IV Push daily  morphine  - Injectable 4 milliGRAM(s) IV Push every 6 hours PRN      PHYSICAL EXAM:    Vital Signs Last 24 Hrs  T(C): 36.6 (22 Dec 2018 06:09), Max: 36.8 (21 Dec 2018 22:51)  T(F): 97.8 (22 Dec 2018 06:09), Max: 98.2 (21 Dec 2018 22:51)  HR: 77 (22 Dec 2018 06:09) (66 - 77)  BP: 121/81 (22 Dec 2018 06:09) (120/71 - 136/79)  BP(mean): --  RR: 16 (22 Dec 2018 06:09) (16 - 17)  SpO2: 95% (21 Dec 2018 11:53) (95% - 95%)    12-21 @ 07:01  -  12-22 @ 07:00  --------------------------------------------------------  IN: 0 mL / OUT: 1250 mL / NET: -1250 mL      GENERAL: AAOx3, no acute distress +NG Tube in place   HEAD:  Atraumatic, Normocephalic  EYES: conjunctiva and sclera clear  NECK: Supple, No thyromegaly   CHEST/LUNG: Clear to auscultation bilaterally; No wheeze, rhonchi, or rales  HEART: Regular rate and rhythm; normal S1, S2, No murmurs.  ABDOMEN: Soft, nontender, nondistended; Bowel sounds present, no abdominal bruit, masses, or hepatosplenomegaly +surgical abdominal scars noted  EXTREMITIES:  2+ Peripheral Pulses. No clubbing, cyanosis, or edema  NEUROLOGY: No asterixis or tremor  SKIN: Intact, no jaundice  EXTREMITIES: warm, no periph edema.      LABS:                        13.9   9.96  )-----------( 232      ( 22 Dec 2018 08:56 )             41.2     12-22    142  |  104  |  12  ----------------------------<  126<H>  4.4   |  27  |  0.9    Ca    8.7      22 Dec 2018 08:56    TPro  7.8  /  Alb  4.7  /  TBili  1.1  /  DBili  <0.2  /  AST  23  /  ALT  27  /  AlkPhos  97  12-21

## 2018-12-23 ENCOUNTER — TRANSCRIPTION ENCOUNTER (OUTPATIENT)
Age: 38
End: 2018-12-23

## 2018-12-23 LAB
ALBUMIN SERPL ELPH-MCNC: 3.7 G/DL — SIGNIFICANT CHANGE UP (ref 3.5–5.2)
ALP SERPL-CCNC: 63 U/L — SIGNIFICANT CHANGE UP (ref 30–115)
ALT FLD-CCNC: 18 U/L — SIGNIFICANT CHANGE UP (ref 0–41)
ANION GAP SERPL CALC-SCNC: 9 MMOL/L — SIGNIFICANT CHANGE UP (ref 7–14)
AST SERPL-CCNC: 12 U/L — SIGNIFICANT CHANGE UP (ref 0–41)
BASOPHILS # BLD AUTO: 0.02 K/UL — SIGNIFICANT CHANGE UP (ref 0–0.2)
BASOPHILS NFR BLD AUTO: 0.2 % — SIGNIFICANT CHANGE UP (ref 0–1)
BILIRUB SERPL-MCNC: 0.7 MG/DL — SIGNIFICANT CHANGE UP (ref 0.2–1.2)
BUN SERPL-MCNC: 16 MG/DL — SIGNIFICANT CHANGE UP (ref 10–20)
CALCIUM SERPL-MCNC: 8.7 MG/DL — SIGNIFICANT CHANGE UP (ref 8.5–10.1)
CHLORIDE SERPL-SCNC: 104 MMOL/L — SIGNIFICANT CHANGE UP (ref 98–110)
CO2 SERPL-SCNC: 30 MMOL/L — SIGNIFICANT CHANGE UP (ref 17–32)
CREAT SERPL-MCNC: 1 MG/DL — SIGNIFICANT CHANGE UP (ref 0.7–1.5)
EOSINOPHIL # BLD AUTO: 0.05 K/UL — SIGNIFICANT CHANGE UP (ref 0–0.7)
EOSINOPHIL NFR BLD AUTO: 0.6 % — SIGNIFICANT CHANGE UP (ref 0–8)
GLUCOSE SERPL-MCNC: 99 MG/DL — SIGNIFICANT CHANGE UP (ref 70–99)
HCT VFR BLD CALC: 38.4 % — LOW (ref 42–52)
HGB BLD-MCNC: 12.5 G/DL — LOW (ref 14–18)
IMM GRANULOCYTES NFR BLD AUTO: 0.2 % — SIGNIFICANT CHANGE UP (ref 0.1–0.3)
LYMPHOCYTES # BLD AUTO: 1.06 K/UL — LOW (ref 1.2–3.4)
LYMPHOCYTES # BLD AUTO: 13.2 % — LOW (ref 20.5–51.1)
MAGNESIUM SERPL-MCNC: 1.9 MG/DL — SIGNIFICANT CHANGE UP (ref 1.8–2.4)
MCHC RBC-ENTMCNC: 28.9 PG — SIGNIFICANT CHANGE UP (ref 27–31)
MCHC RBC-ENTMCNC: 32.6 G/DL — SIGNIFICANT CHANGE UP (ref 32–37)
MCV RBC AUTO: 88.9 FL — SIGNIFICANT CHANGE UP (ref 80–94)
MONOCYTES # BLD AUTO: 0.32 K/UL — SIGNIFICANT CHANGE UP (ref 0.1–0.6)
MONOCYTES NFR BLD AUTO: 4 % — SIGNIFICANT CHANGE UP (ref 1.7–9.3)
NEUTROPHILS # BLD AUTO: 6.54 K/UL — HIGH (ref 1.4–6.5)
NEUTROPHILS NFR BLD AUTO: 81.8 % — HIGH (ref 42.2–75.2)
NRBC # BLD: 0 /100 WBCS — SIGNIFICANT CHANGE UP (ref 0–0)
PHOSPHATE SERPL-MCNC: 1.5 MG/DL — LOW (ref 2.1–4.9)
PLATELET # BLD AUTO: 204 K/UL — SIGNIFICANT CHANGE UP (ref 130–400)
POTASSIUM SERPL-MCNC: 3.9 MMOL/L — SIGNIFICANT CHANGE UP (ref 3.5–5)
POTASSIUM SERPL-SCNC: 3.9 MMOL/L — SIGNIFICANT CHANGE UP (ref 3.5–5)
PROT SERPL-MCNC: 5.8 G/DL — LOW (ref 6–8)
RBC # BLD: 4.32 M/UL — LOW (ref 4.7–6.1)
RBC # FLD: 12.8 % — SIGNIFICANT CHANGE UP (ref 11.5–14.5)
SODIUM SERPL-SCNC: 143 MMOL/L — SIGNIFICANT CHANGE UP (ref 135–146)
WBC # BLD: 8.01 K/UL — SIGNIFICANT CHANGE UP (ref 4.8–10.8)
WBC # FLD AUTO: 8.01 K/UL — SIGNIFICANT CHANGE UP (ref 4.8–10.8)

## 2018-12-23 PROCEDURE — 99232 SBSQ HOSP IP/OBS MODERATE 35: CPT

## 2018-12-23 RX ORDER — PANTOPRAZOLE SODIUM 20 MG/1
1 TABLET, DELAYED RELEASE ORAL
Qty: 30 | Refills: 0 | OUTPATIENT
Start: 2018-12-23 | End: 2019-01-21

## 2018-12-23 RX ADMIN — SODIUM CHLORIDE 100 MILLILITER(S): 9 INJECTION, SOLUTION INTRAVENOUS at 08:42

## 2018-12-23 RX ADMIN — ONDANSETRON 4 MILLIGRAM(S): 8 TABLET, FILM COATED ORAL at 21:22

## 2018-12-23 RX ADMIN — PANTOPRAZOLE SODIUM 40 MILLIGRAM(S): 20 TABLET, DELAYED RELEASE ORAL at 11:23

## 2018-12-23 RX ADMIN — SODIUM CHLORIDE 100 MILLILITER(S): 9 INJECTION, SOLUTION INTRAVENOUS at 17:39

## 2018-12-23 RX ADMIN — Medication 50 MILLIGRAM(S): at 22:06

## 2018-12-23 RX ADMIN — Medication 60 MILLIGRAM(S): at 05:07

## 2018-12-23 NOTE — DISCHARGE NOTE ADULT - OTHER SIGNIFICANT FINDINGS
LABS:                        13.9   9.96  )-----------( 232      ( 22 Dec 2018 08:56 )             41.2     12-22    142  |  104  |  12  ----------------------------<  126<H>  4.4   |  27  |  0.9    Ca    8.7      22 Dec 2018 08:56    TPro  7.8  /  Alb  4.7  /  TBili  1.1  /  DBili  <0.2  /  AST  23  /  ALT  27  /  AlkPhos  97  12-21      MICROBIOLOGY: None      RADIOLOGY & ADDITIONAL TESTS:  X-ray Chest 1 View-PORTABLE IMMEDIATE (12.21.18 @ 11:03)   Support devices: Recently placed enteric tube is in satisfactory   position, terminating overlying the expected region of the stomach in   left upper abdomen    Cardiac/mediastinum/hilum: Unchanged    Lung parenchyma/Pleura: There is no evidence of consolidation, effusion   or pneumothorax.    Skeleton/soft tissues: Stable    Impression:      No radiographic evidence of acute cardiopulmonary disease.    Xray Abdomen Minimum 3 Views (12.23.18 @ 08:53)   Improving small bowel dilatation.      CT Abdomen and Pelvis w/ Oral Cont and w/ IV Cont (12.21.18 @ 07:36)   Multiple dilated fluid-filled loops of small bowel with evidence of   fecalization measuring up to 4.2 cm within the mid abdomen with apparent   transition point in the right lower quadrant consistent with small bowel   obstruction.    Small abdominopelvic ascites.

## 2018-12-23 NOTE — DISCHARGE NOTE ADULT - MEDICATION SUMMARY - MEDICATIONS TO TAKE
I will START or STAY ON the medications listed below when I get home from the hospital:    predniSONE 10 mg oral tablet  -- 4 tab(s) by mouth once a day for 7 days.  Then 3 tabs daily for 7 days.  Then 2 tabs daily for 7 days.  Then 1 tab daily for 7 days.    -- It is very important that you take or use this exactly as directed.  Do not skip doses or discontinue unless directed by your doctor.  Obtain medical advice before taking any non-prescription drugs as some may affect the action of this medication.  Take with food or milk.    -- Indication: For Crohn's disease of small intestine with intestinal obstruction    Remicade  -- Indication: For Crohn's disease of small intestine with intestinal obstruction    Protonix 40 mg oral delayed release tablet  -- 1 tab(s) by mouth once a day   -- It is very important that you take or use this exactly as directed.  Do not skip doses or discontinue unless directed by your doctor.  Obtain medical advice before taking any non-prescription drugs as some may affect the action of this medication.  Swallow whole.  Do not crush.    -- Indication: For GI prophylaxis I will START or STAY ON the medications listed below when I get home from the hospital:    predniSONE 10 mg oral tablet  -- 4 tab(s) by mouth once a day for 7 days.  Then 3 tabs daily for 7 days.  Then 2 tabs daily for 7 days.  Then 1 tab daily for 7 days.    -- It is very important that you take or use this exactly as directed.  Do not skip doses or discontinue unless directed by your doctor.  Obtain medical advice before taking any non-prescription drugs as some may affect the action of this medication.  Take with food or milk.    -- Indication: For Crohn's disease of small intestine with intestinal obstruction    Remicade  -- Indication: For Crohn's disease     Protonix 40 mg oral delayed release tablet  -- 1 tab(s) by mouth once a day   -- It is very important that you take or use this exactly as directed.  Do not skip doses or discontinue unless directed by your doctor.  Obtain medical advice before taking any non-prescription drugs as some may affect the action of this medication.  Swallow whole.  Do not crush.    -- Indication: For GI prohylaxis

## 2018-12-23 NOTE — PROGRESS NOTE ADULT - SUBJECTIVE AND OBJECTIVE BOX
S; Pt continue to pass flatus and had another Bm overnight. NO N/V; still with some abdominal soreness (which he's had with previous resolving SBO)  O; Vital Signs Last 24 Hrs  T(C): 36.7 (23 Dec 2018 05:46), Max: 37.1 (22 Dec 2018 14:39)  T(F): 98.1 (23 Dec 2018 05:46), Max: 98.8 (22 Dec 2018 14:39)  HR: 78 (23 Dec 2018 05:46) (68 - 78)  BP: 119/77 (23 Dec 2018 05:46) (115/66 - 142/81)  BP(mean): --  RR: 16 (23 Dec 2018 05:46) (16 - 16)  SpO2: --    I&O's Detail:    NGT: 600 cc (pt had approx 100 cc ice chips/water, therefore 500 cc gastric output)    EXAM:  abd: soft, ND; mild mid abdominal tenderness, no rebound/guarding; minimal BS    LABS:                        12.5   8.01  )-----------( 204      ( 23 Dec 2018 08:40 )             38.4   12-23    143  |  104  |  16  ----------------------------<  99  3.9   |  30  |  1.0    Ca    8.7      23 Dec 2018 08:40  Phos  1.5     12-23  Mg     1.9     12-23    TPro  5.8<L>  /  Alb  3.7  /  TBili  0.7  /  DBili  x   /  AST  12  /  ALT  18  /  AlkPhos  63  12-23

## 2018-12-23 NOTE — DISCHARGE NOTE ADULT - HOSPITAL COURSE
Patient is a 39yo Male with PMHx Crohn's disease being followed at [facility], on Remicade [id number], last dose 6 weeks ago, prior SBO s/p 2 small bowel resection (21 years ago and one 12 years ago) presented with complaints of Abdominal pain. Pain was described as severe, cramping and sharp & similar to his prior obstructions. It was of sudden onset, initially in the left sided then generalized, associated with nausea and vomiting. He denied any fevers or chills. Last BM and flatus was a day PTP. Patient's last colonoscopy was 8 years ago. CT done showed SBO. He was admitted for further evaluation.         Assessment and Plan:    1. h/o Crohn's disease with SBO:  Obstruction due to Inflammation vs. fibrotic stricture vs. adhesions  CT: Multiple dilated fluid-filled loops of small bowel with evidence of fecalization measuring up to 4.2 cm within the mid abdomen with apparent transition point in the right lower quadrant consistent with small bowel obstruction.  Surgery consulted: NGT placed until patient improved. Serial imaging.   GI following: IV Methylprednisolone 60mg daily until patient has improvement in current symptoms (normal X-ray reduced NG drainage, no pain, + flatus  + BM) .   Then patient can be switched to Prednisone 40mg with taper over 4 weeks  GI prophylaxis.   Patient will follow up with Gastroenterologist at [place].        2. Anxiety:  Continue Vistaril prn. Patient is a 37yo Male with PMHx Crohn's disease being followed at [facility], on Remicade [id number], last dose 6 weeks ago, prior SBO s/p 2 small bowel resection (21 years ago and one 12 years ago) presented with complaints of Abdominal pain. Pain was described as severe, cramping and sharp & similar to his prior obstructions. It was of sudden onset, initially in the left sided then generalized, associated with nausea and vomiting. He denied any fevers or chills. Last BM and flatus was a day PTP. Patient's last colonoscopy was 8 years ago. CT done showed SBO. He was admitted for further evaluation.       Assessment and Plan:    1. h/o Crohn's disease with SBO:  Obstruction due to Inflammation vs. fibrotic stricture vs. adhesions  CT: Multiple dilated fluid-filled loops of small bowel with evidence of fecalization measuring up to 4.2 cm within the mid abdomen with apparent transition point in the right lower quadrant consistent with small bowel obstruction.  Surgery consulted: NGT placed until patient improved. Serial imaging done until SBO resolved.   GI following: IV Methylprednisolone 60mg daily until patient has improvement in current symptoms (normal X-ray reduced NG drainage, no pain, + flatus  + BM) .   Then patient can be switched to Prednisone 40mg with taper over 4 weeks  Continued GI prophylaxis.   Patient stable and tolerating Regular diet.  Patient will follow up with Gastroenterologist at [place].        2. Anxiety:  Started on Vistaril prn.

## 2018-12-23 NOTE — DISCHARGE NOTE ADULT - PLAN OF CARE
Prevent recurrence Complete recommended dose of stroids.  Follow up with Gastroenterologist within 1 week after discharge. Complete recommended dose of steroids.  Follow up with Gastroenterologist within 1 week after discharge.

## 2018-12-23 NOTE — PROGRESS NOTE ADULT - ASSESSMENT
SBO, resolving     - NGT clamped at 8 am -- check residual at noon  - F/U obstructive series - if no SBO and low NGT residual, will D/C NGT and start clears

## 2018-12-23 NOTE — PROGRESS NOTE ADULT - SUBJECTIVE AND OBJECTIVE BOX
CHUCHO LYN  38y  Male    Patient is a 38y old  Male who presents with a chief complaint of Abdominal pain (22 Dec 2018 11:30)      INTERVAL HPI/OVERNIGHT EVENTS:  NGT discontinued.   Abdominal pain and distention Resolved. Patient requesting to be discharged home today.   + BM x 2.       REVIEW OF SYSTEMS:  CONSTITUTIONAL: No fever, weight loss, or fatigue  EYES: No eye pain, visual disturbances, or discharge  ENMT:  No difficulty hearing, tinnitus, vertigo; No sinus or throat pain  NECK: No pain or stiffness  BREASTS: No pain, masses, or nipple discharge  RESPIRATORY: No cough, wheezing, chills or hemoptysis; No shortness of breath  CARDIOVASCULAR: No chest pain, palpitations, dizziness, or leg swelling  GASTROINTESTINAL: No abdominal pain. No nausea, No vomiting, or hematemesis; No diarrhea or constipation. No melena or hematochezia.  GENITOURINARY: No dysuria, frequency, hematuria, or incontinence  NEUROLOGICAL: No headaches, memory loss, loss of strength, numbness, or tremors  SKIN: No itching, burning, rashes, or lesions   LYMPH NODES: No enlarged glands  ENDOCRINE: No heat or cold intolerance; No hair loss  MUSCULOSKELETAL: No joint pain or swelling; No muscle, back, or extremity pain  PSYCHIATRIC: No depression, anxiety, mood swings, or difficulty sleeping  HEME/LYMPH: No easy bruising, or bleeding gums  ALLERGY AND IMMUNOLOGIC: No hives or eczema      VITALS:  T(C): 36.7 (23 Dec 2018 05:46), Max: 37.1 (22 Dec 2018 14:39)  T(F): 98.1 (23 Dec 2018 05:46), Max: 98.8 (22 Dec 2018 14:39)  HR: 78 (23 Dec 2018 05:46) (68 - 78)  BP: 119/77 (23 Dec 2018 05:46) (115/66 - 142/81)  BP(mean): --  RR: 16 (23 Dec 2018 05:46) (16 - 16)  SpO2: --        I&O's Summary    23 Dec 2018 07:01  -  23 Dec 2018 13:06  --------------------------------------------------------  IN: 0 mL / OUT: 0 mL / NET: 0 mL        PHYSICAL EXAM:  GENERAL: NAD  HEAD:  Atraumatic, Normocephalic  EYES: conjunctiva and sclera clear  ENMT: Moist mucous membranes  NECK: Supple, Normal thyroid  NERVOUS SYSTEM:  Alert & Oriented X 3, Good concentration; Motor Strength 5/5 B/L upper and lower extremities  CHEST/LUNG: Clear to auscultation bilaterally; No rales, rhonchi, wheezing, or rubs  HEART: Regular rate and rhythm; No murmurs, rubs, or gallops  ABDOMEN: Soft, mild lower abdominal tenderness, Nondistended; Bowel sounds present, well healed incisional scars.  EXTREMITIES:  2+ Peripheral Pulses, No clubbing, cyanosis, or edema  LYMPH: No lymphadenopathy noted  SKIN: No rashes or lesions    Consultant(s) Notes Reviewed:  [x ] YES  [ ] NO  Care Discussed with Consultants/Other Providers [ x] YES  [ ] NO    LABS:                        13.9   9.96  )-----------( 232      ( 22 Dec 2018 08:56 )             41.2     12-22    142  |  104  |  12  ----------------------------<  126<H>  4.4   |  27  |  0.9    Ca    8.7      22 Dec 2018 08:56    TPro  7.8  /  Alb  4.7  /  TBili  1.1  /  DBili  <0.2  /  AST  23  /  ALT  27  /  AlkPhos  97  12-21      MICROBIOLOGY: None      RADIOLOGY & ADDITIONAL TESTS:  X-ray Chest 1 View-PORTABLE IMMEDIATE (12.21.18 @ 11:03)   Support devices: Recently placed enteric tube is in satisfactory   position, terminating overlying the expected region of the stomach in   left upper abdomen    Cardiac/mediastinum/hilum: Unchanged    Lung parenchyma/Pleura: There is no evidence of consolidation, effusion   or pneumothorax.    Skeleton/soft tissues: Stable    Impression:      No radiographic evidence of acute cardiopulmonary disease.    Xray Abdomen Minimum 3 Views (12.23.18 @ 08:53)   Improving small bowel dilatation.      CT Abdomen and Pelvis w/ Oral Cont and w/ IV Cont (12.21.18 @ 07:36)   Multiple dilated fluid-filled loops of small bowel with evidence of   fecalization measuring up to 4.2 cm within the mid abdomen with apparent   transition point in the right lower quadrant consistent with small bowel   obstruction.    Small abdominopelvic ascites.         Imaging Personally Reviewed:  [x] YES  [ ] NO    MEDICATIONS  (STANDING):  chlorhexidine 4% Liquid 1 Application(s) Topical <User Schedule>  dextrose 5% + sodium chloride 0.9%. 1000 milliLiter(s) (100 mL/Hr) IV Continuous <Continuous>  heparin  Injectable 5000 Unit(s) SubCutaneous every 12 hours  methylPREDNISolone sodium succinate Injectable 60 milliGRAM(s) IV Push daily  pantoprazole  Injectable 40 milliGRAM(s) IV Push daily    MEDICATIONS  (PRN):  benzocaine 15 mG/menthol 3.6 mG (Sugar-Free) Lozenge 1 Lozenge Oral every 4 hours PRN Sore Throat  benzocaine 20% Spray 1 Spray(s) Topical two times a day PRN throat irritation  hydrOXYzine hydrochloride 50 milliGRAM(s) Oral at bedtime PRN sleep  hydrOXYzine hydrochloride Injectable 25 milliGRAM(s) IntraMuscular every 6 hours PRN Anxiety  morphine  - Injectable 4 milliGRAM(s) IV Push every 6 hours PRN Severe Pain (7 - 10)  ondansetron Injectable 4 milliGRAM(s) IV Push every 6 hours PRN Nausea and/or Vomiting        HEALTH ISSUES - PROBLEM Dx:  Small bowel obstruction  Crohn's disease

## 2018-12-23 NOTE — PROGRESS NOTE ADULT - ASSESSMENT
Patient is a 39yo Male with PMHx Crohn's disease being followed at [facility], on Remicade [id number], last dose 6 weeks ago, prior SBO s/p 2 small bowel resection (21 years ago and one 12 years ago) presented with complaints of Abdominal pain. Pain was described as severe, cramping and sharp & similar to his prior obstructions. It was of sudden onset, initially in the left sided then generalized, associated with nausea and vomiting. He denied any fevers or chills. Last BM and flatus was a day PTP. Patient's last colonoscopy was 8 years ago. CT done showed SBO. He was admitted for further evaluation.         Assessment and Plan:    1. h/o Crohn's disease with SBO:  Obstruction due to Inflammation vs. fibrotic stricture vs. adhesions  CT: Multiple dilated fluid-filled loops of small bowel with evidence of fecalization measuring up to 4.2 cm within the mid abdomen with apparent transition point in the right lower quadrant consistent with small bowel obstruction.  Surgery following: Clear liquid diet. NGT discontinued.   GI following: IV Methylprednisolone 60mg daily until patient has improvement in current symptoms (normal X-ray reduced NG drainage, no pain, + flatus  + BM) .   Then patient can be switched to Prednisone 40mg with taper over 4 weeks  GI prophylaxis. .       2. Anxiety:  Continue Vistaril prn.        DVT prophylaxis: Heparin  Disposition: Home when stable.

## 2018-12-23 NOTE — DISCHARGE NOTE ADULT - PATIENT PORTAL LINK FT
You can access the [facility] Patient Portal, offered by [facility], by registering with the following website: [email or web address]

## 2018-12-23 NOTE — DISCHARGE NOTE ADULT - CARE PLAN
Principal Discharge DX:	Crohn's disease of small intestine with intestinal obstruction  Goal:	Prevent recurrence  Assessment and plan of treatment:	Complete recommended dose of stroids.  Follow up with Gastroenterologist within 1 week after discharge. Principal Discharge DX:	Crohn's disease of small intestine with intestinal obstruction  Goal:	Prevent recurrence  Assessment and plan of treatment:	Complete recommended dose of steroids.  Follow up with Gastroenterologist within 1 week after discharge.

## 2018-12-23 NOTE — DISCHARGE NOTE ADULT - CARE PROVIDER_API CALL
Gastroenterologist,   Phone: (   )    -  Fax: (   )    -    PMD,   Phone: (   )    -  Fax: (   )    -

## 2018-12-24 VITALS
HEART RATE: 51 BPM | DIASTOLIC BLOOD PRESSURE: 72 MMHG | TEMPERATURE: 96 F | RESPIRATION RATE: 16 BRPM | SYSTOLIC BLOOD PRESSURE: 108 MMHG

## 2018-12-24 DIAGNOSIS — K50.012 CROHN'S DISEASE OF SMALL INTESTINE WITH INTESTINAL OBSTRUCTION: ICD-10-CM

## 2018-12-24 RX ORDER — PANTOPRAZOLE SODIUM 20 MG/1
1 TABLET, DELAYED RELEASE ORAL
Qty: 30 | Refills: 0
Start: 2018-12-24 | End: 2019-01-22

## 2018-12-24 RX ORDER — SODIUM,POTASSIUM PHOSPHATES 278-250MG
1 POWDER IN PACKET (EA) ORAL
Qty: 15 | Refills: 0
Start: 2018-12-24 | End: 2018-12-28

## 2018-12-24 RX ADMIN — Medication 60 MILLIGRAM(S): at 05:14

## 2018-12-24 NOTE — PROGRESS NOTE ADULT - REASON FOR ADMISSION
Abdominal pain

## 2018-12-24 NOTE — PROGRESS NOTE ADULT - SUBJECTIVE AND OBJECTIVE BOX
38yMale  Being followed for SBO  Interval history: 38yMale pmh of Crohn's disease on Remicade last dose 6 weeks ago, two prior SBOs with 2 prior Small bowel resections one 21 years ago and one 12 years ago had abdominal pain that started in the left lower quadrant that migrated to the left upper quadrant and pain is now periumbilical. NG tube discontinued patient is passing flatus and is able to have bowel movements.      PMHX/PSHX:    PAST MEDICAL & SURGICAL HISTORY:  Crohns disease  Status post small bowel resection      Social History: No smoking. No alcohol. No illegal drug use.          MEDICATIONS:MEDICATIONS  (STANDING):  chlorhexidine 4% Liquid 1 Application(s) Topical <User Schedule>  dextrose 5% + sodium chloride 0.9%. 1000 milliLiter(s) (100 mL/Hr) IV Continuous <Continuous>  heparin  Injectable 5000 Unit(s) SubCutaneous every 12 hours  methylPREDNISolone sodium succinate Injectable 60 milliGRAM(s) IV Push daily  pantoprazole  Injectable 40 milliGRAM(s) IV Push daily    MEDICATIONS  (PRN):  benzocaine 15 mG/menthol 3.6 mG (Sugar-Free) Lozenge 1 Lozenge Oral every 4 hours PRN Sore Throat  benzocaine 20% Spray 1 Spray(s) Topical two times a day PRN throat irritation  hydrOXYzine hydrochloride 50 milliGRAM(s) Oral at bedtime PRN sleep  hydrOXYzine hydrochloride Injectable 25 milliGRAM(s) IntraMuscular every 6 hours PRN Anxiety  morphine  - Injectable 4 milliGRAM(s) IV Push every 6 hours PRN Severe Pain (7 - 10)  ondansetron Injectable 4 milliGRAM(s) IV Push every 6 hours PRN Nausea and/or Vomiting        Allergies:    lactose (Hives)  No Known Drug Allergies    Intolerances          REVIEW OF SYSTEMS:  General:  No weight loss, fevers, or chills.  Eyes:  No reported pain or visual changes  ENT:  No sore throat or runny nose.  NECK: No stiffness   CV:  No chest pain or palpitations.  Resp:  No shortness of breath, cough  GI:  No abdominal pain, nausea, vomiting, dysphagia, diarrhea or constipation. No rectal bleeding, melena, or hematemesis.  Muscle:  No aches or weakness  Neuro:  No tingling, numbness   Heme:  No ecchymosis or easy bruisability        VITAL SIGNS:   T(F): 96.2 (12-24-18 @ 05:28), Max: 98.1 (12-23-18 @ 14:24)  HR: 51 (12-24-18 @ 05:28) (51 - 80)  BP: 108/72 (12-24-18 @ 05:28) (108/72 - 131/82)  RR: 16 (12-24-18 @ 05:28) (16 - 16)  SpO2: --    PHYSICAL EXAM:  GENERAL: AAOx3, no acute distress.  HEAD:  Atraumatic, Normocephalic  EYES: conjunctiva and sclera clear  NECK: Supple, no JVD or thyromegaly  CHEST/LUNG: Clear to auscultation bilaterally; No wheeze, rhonchi, or rales  HEART: Regular rate and rhythm; normal S1, S2, No murmurs.  ABDOMEN: Soft, nontender, nondistended; Bowel sounds present, no abdominal bruit, masses, or hepatosplenomegaly  EXTREMITIES:  2+ Peripheral Pulses. No clubbing, cyanosis, or edema  NEUROLOGY: No asterixis or tremor.   SKIN: Intact, no jaundice  EXTREMITIES: warm, no periph edema.          LABS:                        12.5   8.01  )-----------( 204      ( 23 Dec 2018 08:40 )             38.4     12-23    143  |  104  |  16  ----------------------------<  99  3.9   |  30  |  1.0    Ca    8.7      23 Dec 2018 08:40  Phos  1.5     12-23  Mg     1.9     12-23    TPro  5.8<L>  /  Alb  3.7  /  TBili  0.7  /  DBili  x   /  AST  12  /  ALT  18  /  AlkPhos  63  12-23    LIVER FUNCTIONS - ( 23 Dec 2018 08:40 )  Alb: 3.7 g/dL / Pro: 5.8 g/dL / ALK PHOS: 63 U/L / ALT: 18 U/L / AST: 12 U/L / GGT: x               IMAGING:  < from: Xray Abdomen Minimum 3 Views (12.23.18 @ 08:53) >  EXAM:  XR ABDOMEN MINIMUM 3V            PROCEDURE DATE:  12/23/2018            INTERPRETATION:  Clinical History / Reason for exam: Obstruction.    2 AP views of the abdomen.    Comparison: December 22, 2018.    Findings:    Improved small bowel dilatation. Residual contrast is seen within the   colon no pneumoperitoneum.    Feeding tube in stomach.    Osseous structures demonstrate no acute abnormality.    Impression:    Improving small bowel dilatation.                  ELLIOT LANDAU M.D., ATTENDING RADIOLOGIST  This document has been electronically signed. Dec 23 2018 10:56AM        < end of copied text >

## 2018-12-24 NOTE — PROGRESS NOTE ADULT - SUBJECTIVE AND OBJECTIVE BOX
CHUCHO LYN  38y  Male    Patient is a 38y old  Male who presents with a chief complaint of Abdominal pain (22 Dec 2018 11:30)      INTERVAL HPI/OVERNIGHT EVENTS:  NGT discontinued.   Abdominal pain and distention Resolved. Patient having bowel movement Tolerated CLQ diet.       REVIEW OF SYSTEMS:  CONSTITUTIONAL: No fever, weight loss, or fatigue  EYES: No eye pain, visual disturbances, or discharge  ENMT:  No difficulty hearing, tinnitus, vertigo; No sinus or throat pain  NECK: No pain or stiffness  BREASTS: No pain, masses, or nipple discharge  RESPIRATORY: No cough, wheezing, chills or hemoptysis; No shortness of breath  CARDIOVASCULAR: No chest pain, palpitations, dizziness, or leg swelling  GASTROINTESTINAL: No abdominal pain. No nausea, No vomiting, or hematemesis; No diarrhea or constipation. No melena or hematochezia.  GENITOURINARY: No dysuria, frequency, hematuria, or incontinence  NEUROLOGICAL: No headaches, memory loss, loss of strength, numbness, or tremors  SKIN: No itching, burning, rashes, or lesions   LYMPH NODES: No enlarged glands  ENDOCRINE: No heat or cold intolerance; No hair loss  MUSCULOSKELETAL: No joint pain or swelling; No muscle, back, or extremity pain  PSYCHIATRIC: No depression, anxiety, mood swings, or difficulty sleeping  HEME/LYMPH: No easy bruising, or bleeding gums  ALLERGY AND IMMUNOLOGIC: No hives or eczema      VITALS:  T(C): 35.7 (24 Dec 2018 05:28), Max: 36.7 (23 Dec 2018 14:24)  T(F): 96.2 (24 Dec 2018 05:28), Max: 98.1 (23 Dec 2018 14:24)  HR: 51 (24 Dec 2018 05:28) (51 - 80)  BP: 108/72 (24 Dec 2018 05:28) (108/72 - 131/82)  BP(mean): --  RR: 16 (24 Dec 2018 05:28) (16 - 16)  SpO2: --        I&O's Summary    23 Dec 2018 07:01  -  24 Dec 2018 07:00  --------------------------------------------------------  IN: 0 mL / OUT: 0 mL / NET: 0 mL      PHYSICAL EXAM:  GENERAL: NAD  HEAD:  Atraumatic, Normocephalic  EYES: conjunctiva and sclera clear  ENMT: Moist mucous membranes  NECK: Supple, Normal thyroid  NERVOUS SYSTEM:  Alert & Oriented X 3, Good concentration; Motor Strength 5/5 B/L upper and lower extremities  CHEST/LUNG: Clear to auscultation bilaterally; No rales, rhonchi, wheezing, or rubs  HEART: Regular rate and rhythm; No murmurs, rubs, or gallops  ABDOMEN: Soft, mild lower abdominal tenderness, Nondistended; Bowel sounds present, well healed incisional scars.  EXTREMITIES:  2+ Peripheral Pulses, No clubbing, cyanosis, or edema  LYMPH: No lymphadenopathy noted  SKIN: No rashes or lesions    Consultant(s) Notes Reviewed:  [x ] YES  [ ] NO  Care Discussed with Consultants/Other Providers [ x] YES  [ ] NO    LABS:                                   12.5   8.01  )-----------( 204      ( 23 Dec 2018 08:40 )             38.4     12-23    143  |  104  |  16  ----------------------------<  99  3.9   |  30  |  1.0    Ca    8.7      23 Dec 2018 08:40  Phos  1.5     12-23  Mg     1.9     12-23    TPro  5.8<L>  /  Alb  3.7  /  TBili  0.7  /  DBili  x   /  AST  12  /  ALT  18  /  AlkPhos  63  12-23        MICROBIOLOGY: None      RADIOLOGY & ADDITIONAL TESTS:  X-ray Chest 1 View-PORTABLE IMMEDIATE (12.21.18 @ 11:03)   Support devices: Recently placed enteric tube is in satisfactory   position, terminating overlying the expected region of the stomach in   left upper abdomen    Cardiac/mediastinum/hilum: Unchanged    Lung parenchyma/Pleura: There is no evidence of consolidation, effusion   or pneumothorax.    Skeleton/soft tissues: Stable    Impression:      No radiographic evidence of acute cardiopulmonary disease.    X-ray Abdomen Minimum 3 Views (12.23.18 @ 08:53)   Improving small bowel dilatation.      CT Abdomen and Pelvis w/ Oral Cont and w/ IV Cont (12.21.18 @ 07:36)   Multiple dilated fluid-filled loops of small bowel with evidence of   fecalization measuring up to 4.2 cm within the mid abdomen with apparent   transition point in the right lower quadrant consistent with small bowel   obstruction.    Small abdominopelvic ascites.         Imaging Personally Reviewed:  [x] YES  [ ] NO    MEDICATIONS  (STANDING):  chlorhexidine 4% Liquid 1 Application(s) Topical <User Schedule>  dextrose 5% + sodium chloride 0.9%. 1000 milliLiter(s) (100 mL/Hr) IV Continuous <Continuous>  heparin  Injectable 5000 Unit(s) SubCutaneous every 12 hours  methylPREDNISolone sodium succinate Injectable 60 milliGRAM(s) IV Push daily  pantoprazole  Injectable 40 milliGRAM(s) IV Push daily    MEDICATIONS  (PRN):  benzocaine 15 mG/menthol 3.6 mG (Sugar-Free) Lozenge 1 Lozenge Oral every 4 hours PRN Sore Throat  benzocaine 20% Spray 1 Spray(s) Topical two times a day PRN throat irritation  hydrOXYzine hydrochloride 50 milliGRAM(s) Oral at bedtime PRN sleep  hydrOXYzine hydrochloride Injectable 25 milliGRAM(s) IntraMuscular every 6 hours PRN Anxiety  morphine  - Injectable 4 milliGRAM(s) IV Push every 6 hours PRN Severe Pain (7 - 10)  ondansetron Injectable 4 milliGRAM(s) IV Push every 6 hours PRN Nausea and/or Vomiting          HEALTH ISSUES - PROBLEM Dx:  Small bowel obstruction  Crohn's disease

## 2018-12-24 NOTE — PROGRESS NOTE ADULT - ASSESSMENT
Patient is a 37yo Male with PMHx Crohn's disease being followed at [facility], on Remicade [id number], last dose 6 weeks ago, prior SBO s/p 2 small bowel resection (21 years ago and one 12 years ago) presented with complaints of Abdominal pain. Pain was described as severe, cramping and sharp & similar to his prior obstructions. It was of sudden onset, initially in the left sided then generalized, associated with nausea and vomiting. He denied any fevers or chills. Last BM and flatus was a day PTP. Patient's last colonoscopy was 8 years ago. CT done showed SBO. He was admitted for further evaluation.         Assessment and Plan:    1. h/o Crohn's disease with SBO:  Obstruction due to Inflammation vs. fibrotic stricture vs. adhesions  CT: Multiple dilated fluid-filled loops of small bowel with evidence of fecalization measuring up to 4.2 cm within the mid abdomen with apparent transition point in the right lower quadrant consistent with small bowel obstruction.  Surgery following: Started on Regular diet.    GI following: IV Methylprednisolone 60mg daily until patient has improvement in current symptoms (normal X-ray reduced NG drainage, no pain, + flatus  + BM) .   Then patient can be switched to Prednisone 40mg with taper over 4 weeks  GI prophylaxis. Discharge home if tolerates regular diet.      2. Anxiety:  Continue Vistaril prn.        DVT prophylaxis: Heparin  Disposition: Home when stable. Patient is a 37yo Male with PMHx Crohn's disease being followed at [facility], on Remicade [id number], last dose 6 weeks ago, prior SBO s/p 2 small bowel resection (21 years ago and one 12 years ago) presented with complaints of Abdominal pain. Pain was described as severe, cramping and sharp & similar to his prior obstructions. It was of sudden onset, initially in the left sided then generalized, associated with nausea and vomiting. He denied any fevers or chills. Last BM and flatus was a day PTP. Patient's last colonoscopy was 8 years ago. CT done showed SBO. He was admitted for further evaluation.         Assessment and Plan:    1. h/o Crohn's disease with SBO:  Obstruction due to Inflammation vs. fibrotic stricture vs. adhesions  CT: Multiple dilated fluid-filled loops of small bowel with evidence of fecalization measuring up to 4.2 cm within the mid abdomen with apparent transition point in the right lower quadrant consistent with small bowel obstruction.  Surgery following: Started on Regular diet.    GI following: IV Methylprednisolone 60mg daily until patient has improvement in current symptoms (normal X-ray reduced NG drainage, no pain, + flatus  + BM) .   Then patient can be switched to Prednisone 40mg with taper over 4 weeks  GI prophylaxis. Discharge home if tolerates regular diet.      2. Anxiety:  Continue Vistaril prn.        3. Hypophosphatemia:  Replaced. Monitor Phos level.        DVT prophylaxis: Heparin  Disposition: Home when stable.

## 2018-12-24 NOTE — PROGRESS NOTE ADULT - ASSESSMENT
38yMale pmh of Crohn's disease on Remicade last dose 6 weeks ago, two prior SBOs with 2 prior Small bowel resections one 21 years ago and one 12 years ago had abdominal pain that started in the left lower quadrant that migrated to the left upper quadrant and pain is now periumbilical. NG tube discontinued patient is passing flatus and is able to have bowel movements    Problem-1 Small Bowel Obstruction much improved in a patient with Crohn's and a history of small bowel resection   Inflammatory vs. fibrotic stricture vs. adhesions  -Methylprednisolone 60mg daily until patient has improvement in current symptoms (normal Xray, reduced NG drainage, no pain, + flatus  + BM) . Then patient can be switched to Prednisone 40mg with  taper over 4 weeks  -Patient will also follow up with Dr. Camp at [address] [facility] [phone] upon Discharge

## 2018-12-24 NOTE — PROGRESS NOTE ADULT - SUBJECTIVE AND OBJECTIVE BOX
S; Pt continue to pass flatus and had another Bm overnight. NO N/V; still with some abdominal soreness     TOLERATED CLEARS    O;  Vital Signs Last 24 Hrs  T(C): 35.7 (24 Dec 2018 05:28), Max: 36.7 (23 Dec 2018 14:24)  T(F): 96.2 (24 Dec 2018 05:28), Max: 98.1 (23 Dec 2018 14:24)  HR: 51 (24 Dec 2018 05:28) (51 - 80)  BP: 108/72 (24 Dec 2018 05:28) (108/72 - 131/82)  BP(mean): --  RR: 16 (24 Dec 2018 05:28) (16 - 16)        EXAM:  abd: soft, ND; mild mid abdominal tenderness, no rebound/guarding;  BS +

## 2018-12-28 DIAGNOSIS — Z98.890 OTHER SPECIFIED POSTPROCEDURAL STATES: ICD-10-CM

## 2018-12-28 DIAGNOSIS — Z90.49 ACQUIRED ABSENCE OF OTHER SPECIFIED PARTS OF DIGESTIVE TRACT: ICD-10-CM

## 2018-12-28 DIAGNOSIS — F41.9 ANXIETY DISORDER, UNSPECIFIED: ICD-10-CM

## 2018-12-28 DIAGNOSIS — K56.609 UNSPECIFIED INTESTINAL OBSTRUCTION, UNSPECIFIED AS TO PARTIAL VERSUS COMPLETE OBSTRUCTION: ICD-10-CM

## 2018-12-28 DIAGNOSIS — E83.39 OTHER DISORDERS OF PHOSPHORUS METABOLISM: ICD-10-CM

## 2018-12-28 DIAGNOSIS — K50.012 CROHN'S DISEASE OF SMALL INTESTINE WITH INTESTINAL OBSTRUCTION: ICD-10-CM

## 2019-01-11 ENCOUNTER — EMERGENCY (EMERGENCY)
Facility: HOSPITAL | Age: 39
LOS: 0 days | Discharge: HOME | End: 2019-01-11
Attending: EMERGENCY MEDICINE | Admitting: EMERGENCY MEDICINE
Payer: COMMERCIAL

## 2019-01-11 VITALS
HEART RATE: 81 BPM | SYSTOLIC BLOOD PRESSURE: 151 MMHG | DIASTOLIC BLOOD PRESSURE: 88 MMHG | OXYGEN SATURATION: 99 % | RESPIRATION RATE: 16 BRPM | WEIGHT: 182.1 LBS | HEIGHT: 70 IN | TEMPERATURE: 98 F

## 2019-01-11 DIAGNOSIS — I80.8 PHLEBITIS AND THROMBOPHLEBITIS OF OTHER SITES: ICD-10-CM

## 2019-01-11 DIAGNOSIS — Z79.899 OTHER LONG TERM (CURRENT) DRUG THERAPY: ICD-10-CM

## 2019-01-11 DIAGNOSIS — Z91.011 ALLERGY TO MILK PRODUCTS: ICD-10-CM

## 2019-01-11 DIAGNOSIS — Z98.890 OTHER SPECIFIED POSTPROCEDURAL STATES: ICD-10-CM

## 2019-01-11 DIAGNOSIS — Z87.19 PERSONAL HISTORY OF OTHER DISEASES OF THE DIGESTIVE SYSTEM: ICD-10-CM

## 2019-01-11 DIAGNOSIS — Z79.52 LONG TERM (CURRENT) USE OF SYSTEMIC STEROIDS: ICD-10-CM

## 2019-01-11 DIAGNOSIS — M79.601 PAIN IN RIGHT ARM: ICD-10-CM

## 2019-01-11 DIAGNOSIS — M79.89 OTHER SPECIFIED SOFT TISSUE DISORDERS: ICD-10-CM

## 2019-01-11 DIAGNOSIS — Z90.49 ACQUIRED ABSENCE OF OTHER SPECIFIED PARTS OF DIGESTIVE TRACT: Chronic | ICD-10-CM

## 2019-01-11 PROCEDURE — 93971 EXTREMITY STUDY: CPT | Mod: 26

## 2019-01-11 NOTE — ED PROVIDER NOTE - ATTENDING CONTRIBUTION TO CARE
I personally evaluated the patient. I reviewed the Resident’s or Physician Assistant’s note (as assigned above), and agree with the findings and plan except as documented in my note.    37 y/o M w hx of Crohn's sent in by PMD for RUE US for r/o DVT. Patient was recently admitted to the hospital for SBO and had an IV placed in that extr. Patient was discharged beginning of January and noticed pain along distal vein. Has been taking NSAIDs w some relief.     Plan: RUQ US, reassess

## 2019-01-11 NOTE — ED ADULT NURSE NOTE - NSIMPLEMENTINTERV_GEN_ALL_ED
Implemented All Universal Safety Interventions:  [place] to call system. Call bell, personal items and telephone within reach. Instruct patient to call for assistance. Room bathroom lighting operational. Non-slip footwear when patient is off stretcher. Physically safe environment: no spills, clutter or unnecessary equipment. Stretcher in lowest position, wheels locked, appropriate side rails in place.

## 2019-01-11 NOTE — ED PROVIDER NOTE - MEDICAL DECISION MAKING DETAILS
Patient has plebitis of the distal vein. No DVT evident. Recommend NSAIDs, wamr compresses and vascular outpatient follow-up. Patient agreed w the plan I have full discussed the medical management and delivery of care with the patient. Patient confirms understanding and has been given detailed return precautions. Patient instructed to return to the ED should symptoms persist or worsen. Patient is well appearing upon discharge.

## 2019-01-11 NOTE — ED PROVIDER NOTE - PHYSICAL EXAMINATION
CONSTITUTIONAL: Well-appearing; well-nourished; in no apparent distress.   CARDIOVASCULAR: Normal S1, S2; no murmurs, rubs, or gallops.   RESPIRATORY: Normal chest excursion with respiration; breath sounds clear and equal bilaterally; no wheezes, rhonchi, or rales.  GI/: Normal bowel sounds; non-distended; non-tender; no palpable organomegaly.   MS: + tenderness to right AC and forearm vein c/w superficial thrombophlebitis. 2+ radial pulses. right wrist/hand/elbow with FROM. right hand n/v intact.   SKIN: No erythema/red streak to right forearm   NEURO/PSYCH: A & O x 4;

## 2019-01-11 NOTE — ED PROVIDER NOTE - CARE PROVIDER_API CALL
Jorge Recinos), Vascular Surgery  [address] [place] [address]  Phone: [phone]  Fax: [phone]

## 2019-01-11 NOTE — ED PROVIDER NOTE - OBJECTIVE STATEMENT
Vaccine Information Statement(s) was given today. This has been reviewed, questions answered, and verbal consent given by Parent for injection(s) and administration of Meningococcal  and Tetanus/Diphtheria/Pertussis (Tdap).     39 yo male hx of Crohn's disease present c/o right arm pain/mild swelling since 12/29/18 4 days after he was discharged from hospital. reported he had IV inserted on right forearm during hospital stay. denies redness/red streak/fever/chill/chest pain/sob/abd pain/n/v/d

## 2019-01-11 NOTE — ED PROVIDER NOTE - NS ED ROS FT
Constitutional: no fever, chills, no recent weight loss, change in appetite or malaise  Cardiac: No chest pain, SOB or edema.  Respiratory: No cough or respiratory distress  GI: No nausea, vomiting, diarrhea or abdominal pain.  : No dysuria, frequency, urgency or hematuria  MS: see HPI  Neuro: No headache or weakness. No LOC.  Skin: No skin rash.  Endocrine: No history of thyroid disease or diabetes.

## 2019-01-12 PROBLEM — K50.90 CROHN'S DISEASE, UNSPECIFIED, WITHOUT COMPLICATIONS: Chronic | Status: ACTIVE | Noted: 2018-12-21

## 2019-01-16 PROBLEM — Z00.00 ENCOUNTER FOR PREVENTIVE HEALTH EXAMINATION: Status: ACTIVE | Noted: 2019-01-16

## 2019-01-22 ENCOUNTER — APPOINTMENT (OUTPATIENT)
Dept: VASCULAR SURGERY | Facility: CLINIC | Age: 39
End: 2019-01-22
Payer: COMMERCIAL

## 2019-01-22 VITALS
DIASTOLIC BLOOD PRESSURE: 70 MMHG | WEIGHT: 185 LBS | HEIGHT: 70 IN | SYSTOLIC BLOOD PRESSURE: 120 MMHG | BODY MASS INDEX: 26.48 KG/M2

## 2019-01-22 PROCEDURE — 93971 EXTREMITY STUDY: CPT

## 2019-01-22 PROCEDURE — 99203 OFFICE O/P NEW LOW 30 MIN: CPT

## 2019-01-22 RX ORDER — INFLIXIMAB 100 MG/10ML
100 INJECTION, POWDER, LYOPHILIZED, FOR SOLUTION INTRAVENOUS
Refills: 0 | Status: ACTIVE | COMMUNITY

## 2019-01-22 NOTE — DATA REVIEWED
[FreeTextEntry1] : I performed a venous duplex which was medically necessary to evaluate for resolution of phlebitis. It showed superficial thrombophlebitis in the right cephalic vein.\par

## 2019-01-22 NOTE — HISTORY OF PRESENT ILLNESS
[FreeTextEntry1] : 37 y/o gentleman with Crohn's disease who was admitted for Crohn's exacerbation 3 weeks ago, had IV heplock placed to right antecubital region and right wrist, diagnosed with right cephalic vein superficial phlebitis, now improved with warm compresses, completed Prednisone. Denies any h/o DVT.

## 2019-01-22 NOTE — ASSESSMENT
[FreeTextEntry1] : 39 y/o gentleman with Crohn's disease who was admitted for Crohn's exacerbation 3 weeks ago, had IV heplock placed to right antecubital region and right wrist, diagnosed with right cephalic vein superficial phlebitis, now improved with warm compresses, completed Prednisone. Denies any h/o DVT. I performed a venous duplex which was medically necessary to evaluate for resolution of phlebitis. It showed superficial thrombophlebitis in the right cephalic vein. I have informed him of the test results and no further treatment is needed at the time. He can follow up as needed.\par

## 2019-09-17 NOTE — ED ADULT NURSE NOTE - NSSISCREENINGQ3_ED_A_ED
Kaila Sanders MD, saw and evaluated the patient  I have discussed the patient with the resident/non-physician practitioner and agree with the resident's/non-physician practitioner's findings, Plan of Care, and MDM as documented in the resident's/non-physician practitioner's note, except where noted  All available labs and Radiology studies were reviewed  I was present for key portions of any procedure(s) performed by the resident/non-physician practitioner and I was immediately available to provide assistance  At this point I agree with the current assessment done in the Emergency Department  I have conducted an independent evaluation of this patient a history and physical is as follows:    Patient reports that this afternoon he injected her when and apparently went unresponsive  The EMS providers arrived and gave the patient Narcan after which she awoke  The patient has remained awake since that time  The patient states that prior to using heroin he was feeling well and has had no medical problems  The patient has no complaint at this time other than mild nausea  The patient vomited once after receiving Narcan  The patient denies chest pain, shortness of breath, fevers, or rash physical exam demonstrates a male in no acute distress  The patient is alert and oriented x3  This patient was awake when I walked into the room  HEENT exam is normal   Lungs are clear with equal breath sounds  The heart had a regular rate rhythm  The abdomen is soft and nontender  Extremities are symmetric and nontender  There is no focal neurologic deficit    Critical Care Time  Procedures
No

## 2020-05-05 ENCOUNTER — EMERGENCY (EMERGENCY)
Facility: HOSPITAL | Age: 40
LOS: 0 days | Discharge: HOME | End: 2020-05-05
Attending: EMERGENCY MEDICINE | Admitting: EMERGENCY MEDICINE
Payer: COMMERCIAL

## 2020-05-05 VITALS
DIASTOLIC BLOOD PRESSURE: 75 MMHG | RESPIRATION RATE: 20 BRPM | OXYGEN SATURATION: 100 % | SYSTOLIC BLOOD PRESSURE: 131 MMHG | HEART RATE: 106 BPM

## 2020-05-05 VITALS
TEMPERATURE: 99 F | RESPIRATION RATE: 20 BRPM | SYSTOLIC BLOOD PRESSURE: 131 MMHG | HEIGHT: 69 IN | WEIGHT: 169.98 LBS | DIASTOLIC BLOOD PRESSURE: 75 MMHG | OXYGEN SATURATION: 99 % | HEART RATE: 99 BPM

## 2020-05-05 DIAGNOSIS — Z90.49 ACQUIRED ABSENCE OF OTHER SPECIFIED PARTS OF DIGESTIVE TRACT: Chronic | ICD-10-CM

## 2020-05-05 DIAGNOSIS — R39.198 OTHER DIFFICULTIES WITH MICTURITION: ICD-10-CM

## 2020-05-05 DIAGNOSIS — U07.1 COVID-19: ICD-10-CM

## 2020-05-05 DIAGNOSIS — R53.1 WEAKNESS: ICD-10-CM

## 2020-05-05 DIAGNOSIS — R07.89 OTHER CHEST PAIN: ICD-10-CM

## 2020-05-05 DIAGNOSIS — Z91.018 ALLERGY TO OTHER FOODS: ICD-10-CM

## 2020-05-05 DIAGNOSIS — R42 DIZZINESS AND GIDDINESS: ICD-10-CM

## 2020-05-05 LAB
ALBUMIN SERPL ELPH-MCNC: 4.8 G/DL — SIGNIFICANT CHANGE UP (ref 3.5–5.2)
ALP SERPL-CCNC: 75 U/L — SIGNIFICANT CHANGE UP (ref 30–115)
ALT FLD-CCNC: 26 U/L — SIGNIFICANT CHANGE UP (ref 0–41)
ANION GAP SERPL CALC-SCNC: 16 MMOL/L — HIGH (ref 7–14)
APPEARANCE UR: ABNORMAL
APTT BLD: 25.1 SEC — LOW (ref 27–39.2)
AST SERPL-CCNC: 35 U/L — SIGNIFICANT CHANGE UP (ref 0–41)
BACTERIA # UR AUTO: ABNORMAL
BASOPHILS # BLD AUTO: 0.03 K/UL — SIGNIFICANT CHANGE UP (ref 0–0.2)
BASOPHILS NFR BLD AUTO: 0.3 % — SIGNIFICANT CHANGE UP (ref 0–1)
BILIRUB SERPL-MCNC: 0.3 MG/DL — SIGNIFICANT CHANGE UP (ref 0.2–1.2)
BILIRUB UR-MCNC: ABNORMAL
BUN SERPL-MCNC: 21 MG/DL — HIGH (ref 10–20)
CALCIUM SERPL-MCNC: 9.7 MG/DL — SIGNIFICANT CHANGE UP (ref 8.5–10.1)
CHLORIDE SERPL-SCNC: 101 MMOL/L — SIGNIFICANT CHANGE UP (ref 98–110)
CK SERPL-CCNC: 565 U/L — HIGH (ref 0–225)
CO2 SERPL-SCNC: 21 MMOL/L — SIGNIFICANT CHANGE UP (ref 17–32)
COLOR SPEC: YELLOW — SIGNIFICANT CHANGE UP
CREAT SERPL-MCNC: 0.9 MG/DL — SIGNIFICANT CHANGE UP (ref 0.7–1.5)
DIFF PNL FLD: NEGATIVE — SIGNIFICANT CHANGE UP
EOSINOPHIL # BLD AUTO: 0 K/UL — SIGNIFICANT CHANGE UP (ref 0–0.7)
EOSINOPHIL NFR BLD AUTO: 0 % — SIGNIFICANT CHANGE UP (ref 0–8)
GLUCOSE SERPL-MCNC: 125 MG/DL — HIGH (ref 70–99)
GLUCOSE UR QL: NEGATIVE MG/DL — SIGNIFICANT CHANGE UP
HCT VFR BLD CALC: 38 % — LOW (ref 42–52)
HGB BLD-MCNC: 12.5 G/DL — LOW (ref 14–18)
IMM GRANULOCYTES NFR BLD AUTO: 0.5 % — HIGH (ref 0.1–0.3)
INR BLD: 0.97 RATIO — SIGNIFICANT CHANGE UP (ref 0.65–1.3)
KETONES UR-MCNC: 40
LEUKOCYTE ESTERASE UR-ACNC: NEGATIVE — SIGNIFICANT CHANGE UP
LYMPHOCYTES # BLD AUTO: 0.65 K/UL — LOW (ref 1.2–3.4)
LYMPHOCYTES # BLD AUTO: 5.9 % — LOW (ref 20.5–51.1)
MCHC RBC-ENTMCNC: 27.1 PG — SIGNIFICANT CHANGE UP (ref 27–31)
MCHC RBC-ENTMCNC: 32.9 G/DL — SIGNIFICANT CHANGE UP (ref 32–37)
MCV RBC AUTO: 82.4 FL — SIGNIFICANT CHANGE UP (ref 80–94)
MONOCYTES # BLD AUTO: 0.28 K/UL — SIGNIFICANT CHANGE UP (ref 0.1–0.6)
MONOCYTES NFR BLD AUTO: 2.6 % — SIGNIFICANT CHANGE UP (ref 1.7–9.3)
NEUTROPHILS # BLD AUTO: 9.93 K/UL — HIGH (ref 1.4–6.5)
NEUTROPHILS NFR BLD AUTO: 90.7 % — HIGH (ref 42.2–75.2)
NITRITE UR-MCNC: POSITIVE
NRBC # BLD: 0 /100 WBCS — SIGNIFICANT CHANGE UP (ref 0–0)
PH UR: 5.5 — SIGNIFICANT CHANGE UP (ref 5–8)
PLATELET # BLD AUTO: 238 K/UL — SIGNIFICANT CHANGE UP (ref 130–400)
POTASSIUM SERPL-MCNC: 4 MMOL/L — SIGNIFICANT CHANGE UP (ref 3.5–5)
POTASSIUM SERPL-SCNC: 4 MMOL/L — SIGNIFICANT CHANGE UP (ref 3.5–5)
PROT SERPL-MCNC: 7.2 G/DL — SIGNIFICANT CHANGE UP (ref 6–8)
PROT UR-MCNC: ABNORMAL MG/DL
PROTHROM AB SERPL-ACNC: 11.2 SEC — SIGNIFICANT CHANGE UP (ref 9.95–12.87)
RBC # BLD: 4.61 M/UL — LOW (ref 4.7–6.1)
RBC # FLD: 13.2 % — SIGNIFICANT CHANGE UP (ref 11.5–14.5)
SODIUM SERPL-SCNC: 138 MMOL/L — SIGNIFICANT CHANGE UP (ref 135–146)
SP GR SPEC: 1.02 — SIGNIFICANT CHANGE UP (ref 1.01–1.03)
TRI-PHOS CRY UR QL COMP ASSIST: ABNORMAL /HPF
TROPONIN T SERPL-MCNC: <0.01 NG/ML — SIGNIFICANT CHANGE UP
UROBILINOGEN FLD QL: 0.2 MG/DL — SIGNIFICANT CHANGE UP (ref 0.2–0.2)
WBC # BLD: 10.94 K/UL — HIGH (ref 4.8–10.8)
WBC # FLD AUTO: 10.94 K/UL — HIGH (ref 4.8–10.8)
WBC UR QL: SIGNIFICANT CHANGE UP /HPF

## 2020-05-05 PROCEDURE — 99285 EMERGENCY DEPT VISIT HI MDM: CPT

## 2020-05-05 PROCEDURE — 71045 X-RAY EXAM CHEST 1 VIEW: CPT | Mod: 26

## 2020-05-05 RX ORDER — CEFPODOXIME PROXETIL 100 MG
1 TABLET ORAL
Qty: 14 | Refills: 0
Start: 2020-05-05 | End: 2020-05-11

## 2020-05-05 RX ORDER — INFLIXIMAB-DYYB 120 MG/ML
0 INJECTION SUBCUTANEOUS
Qty: 0 | Refills: 0 | DISCHARGE

## 2020-05-05 RX ORDER — SODIUM CHLORIDE 9 MG/ML
1000 INJECTION INTRAMUSCULAR; INTRAVENOUS; SUBCUTANEOUS ONCE
Refills: 0 | Status: COMPLETED | OUTPATIENT
Start: 2020-05-05 | End: 2020-05-05

## 2020-05-05 RX ADMIN — SODIUM CHLORIDE 1000 MILLILITER(S): 9 INJECTION INTRAMUSCULAR; INTRAVENOUS; SUBCUTANEOUS at 13:24

## 2020-05-05 NOTE — ED PROVIDER NOTE - CARE PLAN
Principal Discharge DX:	COVID-19  Secondary Diagnosis:	Urine abnormality  Secondary Diagnosis:	Weakness

## 2020-05-05 NOTE — ED ADULT NURSE NOTE - NSIMPLEMENTINTERV_GEN_ALL_ED
Implemented All Fall with Harm Risk Interventions:  [place] to call system. Call bell, personal items and telephone within reach. Instruct patient to call for assistance. Room bathroom lighting operational. Non-slip footwear when patient is off stretcher. Physically safe environment: no spills, clutter or unnecessary equipment. Stretcher in lowest position, wheels locked, appropriate side rails in place. Provide visual cue, wrist band, yellow gown, etc. Monitor gait and stability. Monitor for mental status changes and reorient to person, place, and time. Review medications for side effects contributing to fall risk. Reinforce activity limits and safety measures with patient and family. Provide visual clues: red socks.

## 2020-05-05 NOTE — ED PROVIDER NOTE - OBJECTIVE STATEMENT
38 y/o male with hx of UC on remicade , recent covid +, presents to the ED in [facility] custody for ongoing investigation. patient has had intermittent, fevers, weakness, dry mouth, dizziness and SOB over past 4 weeks. patient was outdoors today , waiting with police for about 2 hrs when onset of weakness, chest pressure and dizziness with chills started. patient denies any vomiting, diarrhea, back pain , dysuria. patient urinated in the ED, grossly bloody

## 2020-05-05 NOTE — ED PROVIDER NOTE - CLINICAL SUMMARY MEDICAL DECISION MAKING FREE TEXT BOX
sx likely due non critical sequelae of covid 19 infection.  In my opinion, out patient treatment and follow up are appropriate.

## 2020-05-05 NOTE — ED PROVIDER NOTE - NSFOLLOWUPINSTRUCTIONS_ED_ALL_ED_FT
Urinary Tract Infection    A urinary tract infection (UTI) is an infection of any part of the urinary tract, which includes the kidneys, ureters, bladder, and urethra. Risk factors include ignoring your need to urinate, wiping back to front if female, being an uncircumcised male, and having diabetes or a weak immune system. Symptoms include frequent urination, pain or burning with urination, foul smelling urine, cloudy urine, pain in the lower abdomen, blood in the urine, and fever. If you were prescribed an antibiotic medicine, take it as told by your health care provider. Do not stop taking the antibiotic even if you start to feel better.    SEEK IMMEDIATE MEDICAL CARE IF YOU HAVE ANY OF THE FOLLOWING SYMPTOMS: severe back or abdominal pain, fever, inability to keep fluids or medicine down, dizziness/lightheadedness, or a change in mental status.      Novel Coronavirus (COVID-19)  The Facts  What is a coronavirus?  Coronaviruses are a large family of viruses that cause illnesses ranging from the common cold  to more severe diseases such as Middle East Respiratory Syndrome (MERS) and Severe Acute  Respiratory Syndrome (SARS).  What is Novel Coronavirus (COVID-19)?  COVID-19 is a new strain of Coronavirus that has not been previously identified in humans. COVID-19  was identified in [place], [place], [place] in December 2019 (COVID-19). COVID-19 has  since been identified outside of [place], in a growing number of countries internationally, including  the [place].  Where can I find the most recent information about COVID-19?  The Centers for Disease Control and Prevention (CDC) is closely monitoring the outbreak caused by the  COVID-19. For the latest information about COVID- 19, visit the CDC website at  https://www.cdc.gov/coronavirus/index.html  How are coronaviruses spread?  Coronaviruses can be transmitted from person-to- person, usually after close contact with an infected person,  for example, in a household, workplace, or healthcare setting via droplets that become airborne after a cough  or sneeze by an affected person. These droplets can then infect a nearby person. It is likely transmission also  occurs by touching recently contaminated surfaces.  What are the symptoms of coronavirus infection?  It depends on the virus, but common signs include fever and/or respiratory symptoms such as  cough and shortness of breath. In more severe cases, infection can cause pneumonia, severe acute  respiratory syndrome, kidney failure and even death. Fortunately, most cases of COVID-19 have an  illness no different than the influenza “flu”. With a majority of these patients having mild symptoms  and overall mortality which appears to be not much different than the flu.  Is there a treatment for a COVID-19?  There is no specific treatment for disease caused by COVID-19. However, many of the symptoms can  be treated based on the patient’s clinical condition. Supportive care for infected persons can be highly  effective.  What can I do to protect myself?  Washing your hands, covering your cough, and disinfecting surfaces are the best precautionary  measures. It is also advisable to avoid close contact with anyone showing symptoms of respiratory  illness such as coughing and sneezing. Those with symptoms should wear a surgical mask when  around others.  What can I do to protect those around me?  If you have been identified as someone who may be infected with COVID-19, we recommend you  follow the self-isolation procedures outlined below to protect those around you and limit the spread  of this virus.   March 3, 2020  Recommendations for Patients Advised to Self-Isolate  for Possible COVID-19 Exposure  We recommend the below precautionary steps from now until 14 days from when you  returned from your travel or date of your last known possible contact:  - Do not go to work, school, or public areas. Avoid using public transportation, ride-sharing, or  taxis.  - As much as possible, separate yourself from other people in your home. If you can, you should  stay in a room and away from other people in your home. Also, you should use a separate  bathroom, if available.  - Wear the supplied mask whenever you are around other people.  - If you have a non-urgent medical appointment, please reschedule for a later date. If the  appointment is urgent, please call the healthcare provider and tell them that you are on selfisolation for possible COVID-19. This will help the healthcare provider’s office take steps to keep  other people from getting infected or exposed. If you can reschedule routine appointments, do  so.  - Wash your hands often with soap and water for at least 15 to 20 seconds or clean your hands  with an alcohol-based hand  that contains 60 to 95% alcohol, covering all surfaces of  your hands and rubbing them together until they feel dry. Soap and water should be used  preferentially if hands are visibly dirty.  - Cover your mouth and nose with a tissue when you cough or sneeze. Throw used tissues in a  lined trash can; immediately wash your hands.  - Avoid touching your eyes, nose, and mouth with your hands.  - Avoid sharing personal household items. You should not share dishes, drinking glasses, cups,  eating utensils, towels, or bedding with other people or pets in your home. After using these  items, they should be washed thoroughly with soap and water.  - Clean and disinfect all “high-touch” surfaces every day. High touch surfaces include counters,  tabletops, doorknobs, light switches, remote controls, bathroom fixtures, toilets, phones,  keyboards, tablets, and bedside tables. Also, clean any surfaces that may have blood, stool, or  body fluids on them.       If you develop worsening symptoms:  - If you develop worsening symptoms, such as severe shortness of breath, please call [phone] option #9. They will assist you in determining your next steps.  During your time on self-isolation do the following:  - Work from home if you are able to so.  - Limit social isolation by talking with friends and family on the phone or with face-time  - Talk with friends and relatives who don’t live with you about supporting each other if one  household has to be quarantined. For example, agree to drop groceries or other supplies at the  front door.  - Exercise and spend time outdoors away from others if able to do so.    Why didn’t I get tested for novel coronavirus (COVID-19)?  The number of available tests is very limited so strict rules exist for who is allowed to be tested.  [facility] has been authorized to perform testing and is currently working hard to be  able to start providing the test. Such testing is currently reserved for patients who have had  contact with someone infected with the virus, or those who are very sick a plus those who have  traveled to areas identified by the Centers for Disease Control and Prevention (CD) and will  require hospitalization.  What should I do now?  If you are well enough to be discharged home and are not in a high risk group to have  contracted the COVID-10, you should care for yourself at home exactly like you would if you  have Influenza “flu”. Follow all the standard guidelines about washing your hands, covering  your cough, etc.  You should return to the Emergency Department if you develop worse symptoms, trouble  breathing, chest pain, and/or a fever that doesn’t improve with over the counter  acetaminophen or ibuprofen.

## 2020-05-05 NOTE — ED PROVIDER NOTE - PATIENT PORTAL LINK FT
You can access the FollowMyHealth Patient Portal offered by [facility] by registering at the following website: [email or web address]. By joining [employer]’s FollowMyHealth portal, you will also be able to view your health information using other applications (apps) compatible with our system.

## 2020-05-05 NOTE — ED PROVIDER NOTE - ATTENDING CONTRIBUTION TO CARE
NAD.  VS noted.  Chest clear.  Heart RR no murmur.  abd NT.  Ext FROM.  =pulses. dx testing reviewed.

## 2020-05-05 NOTE — ED PROVIDER NOTE - NS ED ROS FT
Review of Systems    Constitutional: (+) fever/ chills (+) weight loss  Eyes/ENT: (-) blurry vision, (-) epistaxis (-) sore throat (-) ear pain  Cardiovascular: (+) chest pain, (-) syncope (-) palpitations  Respiratory: (-) cough, (+) shortness of breath  Gastrointestinal: (-) vomiting, (-) diarrhea (-) abdominal pain  Musculoskeletal: (-) neck pain, (-) back pain, (-) joint pain (-) pedal edema   Integumentary: (-) rash, (-) swelling  Neurological: (-) headache, (-) altered mental status (+)weakness

## 2020-05-05 NOTE — ED PROVIDER NOTE - PHYSICAL EXAMINATION
Vital Signs: I have reviewed the initial vital signs.  Constitutional: well-nourished, no acute distress  Eyes: PERRLA, EOMI, no nystagmus, pallor conjunctiva  ENT: dry mucous membranes  Cardiovascular: regular rate, regular rhythm,  Respiratory: unlabored respiratory effort, clear to auscultation bilaterally  Gastrointestinal: soft, non-tender, non-distended  abdomen, no CVa tenderness  Musculoskeletal: supple neck, no lower extremity edema, no bony tenderness  Integumentary: pallor, warm  Neurologic: awake, alert, cranial nerves II-XII grossly intact, extremities’ motor and sensory functions grossly intact, no focal deficits, GCS 15

## 2020-05-06 LAB
CULTURE RESULTS: SIGNIFICANT CHANGE UP
SPECIMEN SOURCE: SIGNIFICANT CHANGE UP

## 2021-03-31 NOTE — ED ADULT NURSE NOTE - NS ED NURSE LEVEL OF CONSCIOUSNESS SPEECH
Addended by: JOANNE DEUTSCH on: 3/31/2021 10:08 AM     Modules accepted: Orders     Speaking Coherently

## 2022-04-21 NOTE — ED ADULT TRIAGE NOTE - BP NONINVASIVE DIASTOLIC (MM HG)
Detail Level: Generalized Detail Level: Detailed 93 Patient Specific Counseling (Will Not Stick From Patient To Patient): Photo taken today, recheck at 2 week follow up\\n\\nIf not resolved, plan shave biopsy

## 2023-01-24 NOTE — ED ADULT NURSE NOTE - CAS EDP DISCH TYPE
Home

## 2023-02-28 ENCOUNTER — APPOINTMENT (OUTPATIENT)
Dept: CARDIOLOGY | Facility: CLINIC | Age: 43
End: 2023-02-28

## 2023-02-28 DIAGNOSIS — I80.8 PHLEBITIS AND THROMBOPHLEBITIS OF OTHER SITES: ICD-10-CM

## 2023-02-28 NOTE — REASON FOR VISIT
[Other: ____] : [unfilled] [FreeTextEntry1] : Diagnostic Tests:\par --------------------\par EKG: \par 05/05/20-NSR. Normal EKG.

## 2023-03-20 ENCOUNTER — APPOINTMENT (OUTPATIENT)
Dept: PULMONOLOGY | Facility: CLINIC | Age: 43
End: 2023-03-20
Payer: MEDICAID

## 2023-03-20 VITALS
SYSTOLIC BLOOD PRESSURE: 122 MMHG | DIASTOLIC BLOOD PRESSURE: 80 MMHG | OXYGEN SATURATION: 98 % | HEART RATE: 80 BPM | HEIGHT: 70 IN | WEIGHT: 187 LBS | BODY MASS INDEX: 26.77 KG/M2 | RESPIRATION RATE: 14 BRPM

## 2023-03-20 DIAGNOSIS — Z82.5 FAMILY HISTORY OF ASTHMA AND OTHER CHRONIC LOWER RESPIRATORY DISEASES: ICD-10-CM

## 2023-03-20 DIAGNOSIS — K50.90 CROHN'S DISEASE, UNSPECIFIED, W/OUT COMPLICATIONS: ICD-10-CM

## 2023-03-20 DIAGNOSIS — J45.40 MODERATE PERSISTENT ASTHMA, UNCOMPLICATED: ICD-10-CM

## 2023-03-20 DIAGNOSIS — Z86.69 PERSONAL HISTORY OF OTHER DISEASES OF THE NERVOUS SYSTEM AND SENSE ORGANS: ICD-10-CM

## 2023-03-20 PROCEDURE — 94010 BREATHING CAPACITY TEST: CPT

## 2023-03-20 PROCEDURE — 99204 OFFICE O/P NEW MOD 45 MIN: CPT | Mod: 25

## 2023-03-20 PROCEDURE — 71046 X-RAY EXAM CHEST 2 VIEWS: CPT

## 2023-03-20 RX ORDER — BUDESONIDE AND FORMOTEROL FUMARATE DIHYDRATE 80; 4.5 UG/1; UG/1
80-4.5 AEROSOL RESPIRATORY (INHALATION) 4 TIMES DAILY
Qty: 1 | Refills: 1 | Status: ACTIVE | COMMUNITY
Start: 2023-03-20 | End: 1900-01-01

## 2023-03-20 NOTE — HISTORY OF PRESENT ILLNESS
[Never] : never [TextBox_4] : Mr. LYN is a 42 year man with a medical history significant for Crohn's disease s/p small bowel resection on Remicade and superficial thrombophlebitis presenting today to the clinic for intermittent shortness of breath.\par \par Hx of PNA as a kid, has some scarring\par never smoker, infrequent marijuana use\par no lung w/u recently\par \par \par SOB for ~2months, similar to COVID Sx\par dyspnea like exertion w/ no movement\par happens many times per day, lasts 10-20 minutes per episode\par endorses anxiety since 2020, had a lot of life difficulties since then\par no aggravating features, not taking anything for it\par Never had any problems w/ Remicade\par Crohns up and down week to week\par Hasn't seen GI in a while\par \par \par Occupational Hx: , exposure to dusts/chemicals and fumes

## 2023-03-20 NOTE — PROCEDURE
[FreeTextEntry1] : In-office spirometry performed, flow-volume loops are normal, spirometry is within normal limits.\par \par CXR PA+Lateral performed in-office\par bilateral costophrenic angles are sharp without pleural effusion\par mediastinum is within normal parameters\par Large airways are patent and midline, without stenosis or obstruction\par no hilar lymphadenopathy noted\par bilateral diaphragms appropriate\par lung parenchyma bilaterally clear without infiltrates\par

## 2023-04-28 ENCOUNTER — EMERGENCY (EMERGENCY)
Facility: HOSPITAL | Age: 43
LOS: 0 days | Discharge: HOME | End: 2023-04-28
Attending: EMERGENCY MEDICINE
Payer: MEDICAID

## 2023-04-28 VITALS
DIASTOLIC BLOOD PRESSURE: 94 MMHG | HEART RATE: 97 BPM | TEMPERATURE: 97 F | OXYGEN SATURATION: 98 % | SYSTOLIC BLOOD PRESSURE: 140 MMHG | RESPIRATION RATE: 19 BRPM | WEIGHT: 184.97 LBS

## 2023-04-28 DIAGNOSIS — Z86.69 PERSONAL HISTORY OF OTHER DISEASES OF THE NERVOUS SYSTEM AND SENSE ORGANS: ICD-10-CM

## 2023-04-28 DIAGNOSIS — H93.11 TINNITUS, RIGHT EAR: ICD-10-CM

## 2023-04-28 DIAGNOSIS — Z90.49 ACQUIRED ABSENCE OF OTHER SPECIFIED PARTS OF DIGESTIVE TRACT: Chronic | ICD-10-CM

## 2023-04-28 DIAGNOSIS — Z91.011 ALLERGY TO MILK PRODUCTS: ICD-10-CM

## 2023-04-28 PROCEDURE — 99283 EMERGENCY DEPT VISIT LOW MDM: CPT

## 2023-04-28 PROCEDURE — 99282 EMERGENCY DEPT VISIT SF MDM: CPT

## 2023-04-28 NOTE — ED PROVIDER NOTE - CARE PROVIDER_API CALL
Judson Guerrero)  Otolaryngology  [address] [place] [address], [address] [place] [address]  Phone: [phone]  Fax: [phone]  Follow Up Time: 1-3 Days

## 2023-04-28 NOTE — ED ADULT TRIAGE NOTE - DIRECT TO ROOM CARE INITIATED:
Pharmacy Progress Note - Telephone Encounter    S/O: Mr. Beba Edgar 50 y.o. male, referred by Dr. Deanna Chance, was contacted via an outbound telephone call to discuss his hyperglycemia today. Verified patients identifiers (name & ) per HIPAA policy.     - Still taking invokana 100 mg daily and januvia 100 mg daily   - Reports BG were \"fine\" until this week. \"I did it to myself\"   - Attributes hyperglycemia to \"sitting on the couch all day and drinking chocolate milk for 3 consecutive days. \"  - Noticed blurry vision and increased in thirst ---> -300  - Now drinking more fluids. - [facility] insurance    Wt Readings from Last 3 Encounters:   20 237 lb (107.5 kg)   19 242 lb (109.8 kg)   19 246 lb (111.6 kg)     Past Medical History:   Diagnosis Date    Bilateral carotid artery stenosis     Diabetic peripheral neuropathy associated with type 2 diabetes mellitus ([address]     Foot fracture     H/O aortic dissection     Hollenhorst plaque, right eye     Hypertension     Jaw fracture (HCC)     Ruptured ear drum     Subjective visual disturbance, right eye     Thumb fracture      Current Outpatient Medications   Medication Sig    glucose blood VI test strips (blood glucose test) strip Check fasting blood sugar daily using blood sugar test strip. Dx: e11.21    sennosides 8.6 mg cap senna 8.6 mg capsule   Take 2 capsules every day by oral route as needed.  canagliflozin (Invokana) 100 mg tablet Take 100 mg by mouth Daily (before breakfast).  clopidogreL (PLAVIX) 75 mg tab Take 1 Tab by mouth daily.  hydrALAZINE (APRESOLINE) 50 mg tablet Take 1 Tab by mouth three (3) times daily.  atorvastatin (LIPITOR) 40 mg tablet Take 1 Tab by mouth nightly.  carvedilol (COREG) 12.5 mg tablet Take 1 Tab by mouth two (2) times daily (with meals).  famotidine (PEPCID) 40 mg tablet Take 1 Tab by mouth daily.  losartan (COZAAR) 25 mg tablet Take 1 Tab by mouth daily.     amLODIPine (NORVASC) 10 mg tablet TAKE 1 TABLET BY MOUTH ONCE DAILY    SITagliptin (JANUVIA) 100 mg tablet Take 1 Tab by mouth daily.  ferrous sulfate 325 mg (65 mg iron) tablet Take 1 Tab by mouth Daily (before breakfast).  acetaminophen (TYLENOL) 325 mg tablet Take 325-650 mg by mouth every four (4) hours as needed for Pain.  cetirizine (ZYRTEC) 10 mg tablet Take 10 mg by mouth daily as needed for Allergies. No current facility-administered medications for this visit. Lab Results   Component Value Date/Time    Sodium 139 12/06/2019 04:10 AM    Potassium 3.2 (L) 12/06/2019 04:10 AM    Chloride 105 12/06/2019 04:10 AM    CO2 27 12/06/2019 04:10 AM    Anion gap 7 12/06/2019 04:10 AM    Glucose 138 (H) 12/06/2019 04:10 AM    BUN 23 (H) 12/06/2019 04:10 AM    Creatinine 1.76 (H) 12/06/2019 04:10 AM    BUN/Creatinine ratio 13 12/06/2019 04:10 AM    GFR est AA 50 (L) 12/06/2019 04:10 AM    GFR est non-AA 42 (L) 12/06/2019 04:10 AM    Calcium 8.2 (L) 12/06/2019 04:10 AM    Bilirubin, total 0.6 12/03/2019 09:53 PM    AST (SGOT) 20 12/03/2019 09:53 PM    Alk. phosphatase 64 12/03/2019 09:53 PM    Protein, total 8.7 (H) 12/03/2019 09:53 PM    Albumin 4.4 12/03/2019 09:53 PM    Globulin 4.3 (H) 12/03/2019 09:53 PM    A-G Ratio 1.0 (L) 12/03/2019 09:53 PM    ALT (SGPT) 28 12/03/2019 09:53 PM     Lab Results   Component Value Date/Time    Microalb/Creat ratio (ug/mg creat.) 92.8 (H) 10/07/2019 08:57 AM     Lab Results   Component Value Date/Time    Hemoglobin A1c 7.8 (H) 12/05/2019 03:05 AM    Hemoglobin A1c 8.9 (H) 10/07/2019 08:57 AM    Hemoglobin A1c 7.8 (H) 06/06/2019 12:16 PM     Estimated Creatinine Clearance: 69 mL/min (A) (by C-G formula based on SCr of 1.76 mg/dL (H)). A/P:  - Hyperglycemia secondary to high carb intake and increased sedentary lifestyle. - Recommend hydration at this time. Pt has PCP follow up tomorrow  - Recommend BMP and A1c chceked first before new therapy.    Depending on result, would recommend Trulicity (will need to stop [place]) or Lantus/Tresiba. - Patient endorses understanding to the provided information. All questions answered at this time.      Thank you for the consult,  Johanna Jimenez, PharmD, BCACP, CDE                             CLINICAL PHARMACY CONSULT: MED RECONCILIATION/REVIEW ADDENDUM    For Pharmacy Admin Tracking Only    PHSO: Andrés Johnson [id number] Patient?: No  Total # of Interventions Recommended: Count: 2  - New Therapy Lab Monitoring #: 2    Time Spent (min): 15 28-Apr-2023 20:26

## 2023-04-28 NOTE — ED PROVIDER NOTE - CCCP TRG CHIEF CMPLNT
CERTIFICATE OF RETURN TO WORK        Re: Jake Sellers        This is to certify that Jake Sellers has been under my care from 3/7/2018 and can return to regular work on Friday March 9 2018.      RESTRICTIONS: off work tomorrow.       REMARKS:         SIGNATURE:___________________________________________,   3/7/2018                                                         Dr. Jordin Shukla  Urgent Care Services  [facility] [address] [place]  [address]  [phone]      
ear pain

## 2023-04-28 NOTE — ED PROVIDER NOTE - NSFOLLOWUPINSTRUCTIONS_ED_ALL_ED_FT
Our Emergency Department Referral Coordinators will be reaching out to you in the next 24-48 hours from 9:00am to 5:00pm with a follow up appointment. Please expect a phone call from the hospital in that time frame. If you do not receive a call or if you have any questions or concerns, you can reach them at   [phone]      Tinnitus    Tinnitus refers to hearing a sound when there is no actual source for that sound. This is often described as ringing in the ears. However, people with this condition may hear a variety of noises. A person may hear the sound in one ear or in both ears.    The sounds of tinnitus can be soft, loud, or somewhere in between. Tinnitus can last for a few seconds or can be constant for days. It may go away without treatment and come back at various times. When tinnitus is constant or happens often, it can lead to other problems, such as trouble sleeping and trouble concentrating.    Almost everyone experiences tinnitus at some point. Tinnitus that is long-lasting (chronic) or comes back often is a problem that may require medical attention.    What are the causes?  The cause of tinnitus is often not known. In some cases, it can result from other problems or conditions, including:    Exposure to loud noises from machinery, music, or other sources.  Hearing loss.  Ear or sinus infections.  Earwax buildup.  A foreign object in the ear.  Use of certain medicines.  Use of alcohol and caffeine.  High blood pressure.  Heart diseases.  Anemia.  Allergies.  Meniere disease.  Thyroid problems.  Tumors.  An enlarged part of a weakened blood vessel (aneurysm).    What are the signs or symptoms?  The main symptom of tinnitus is hearing a sound when there is no source for that sound. It may sound like:    Buzzing.  Roaring.  Ringing.  Blowing air, similar to the sound heard when you listen to a seashell.  Hissing.  Whistling.  Sizzling.  Humming.  Running water.  A sustained musical note.    How is this diagnosed?  Tinnitus is diagnosed based on your symptoms. Your health care provider will do a physical exam. A comprehensive hearing exam (audiologic exam) will be done if your tinnitus:    Affects only one ear (unilateral).  Causes hearing difficulties.  Lasts 6 months or longer.    You may also need to see a health care provider who specializes in hearing disorders (audiologist). You may be asked to complete a questionnaire to determine the severity of your tinnitus. Tests may be done to help determine the cause and to rule out other conditions. These can include:    Imaging studies of your head and brain, such as:    A CT scan.  An MRI.    An imaging study of your blood vessels (angiogram).    How is this treated?  Treating an underlying medical condition can sometimes make tinnitus go away. If your tinnitus continues, other treatments may include:    Medicines, such as certain antidepressants or sleeping aids.  Sound generators to mask the tinnitus. These include:    Tabletop sound machines that play relaxing sounds to help you fall asleep.  Wearable devices that fit in your ear and play sounds or music.  A small device that uses headphones to deliver a signal embedded in music (acoustic neural stimulation). In time, this may change the pathways of your brain and make you less sensitive to tinnitus. This device is used for very severe cases when no other treatment is working.    Therapy and counseling to help you manage the stress of living with tinnitus.  Using hearing aids or cochlear implants, if your tinnitus is related to hearing loss.    Follow these instructions at home:  When possible, avoid being in loud places and being exposed to loud sounds.  Wear hearing protection, such as earplugs, when you are exposed to loud noises.  Do not take stimulants, such as nicotine, alcohol, or caffeine.  Practice techniques for reducing stress, such as meditation, yoga, or deep breathing.  Use a white noise machine, a humidifier, or other devices to mask the sound of tinnitus.  Sleep with your head slightly raised. This may reduce the impact of tinnitus.  Try to get plenty of rest each night.  Contact a health care provider if:  You have tinnitus in just one ear.  Your tinnitus continues for 3 weeks or longer without stopping.  Home care measures are not helping.  You have tinnitus after a head injury.  You have tinnitus along with any of the following:    Dizziness.  Loss of balance.  Nausea and vomiting.    This information is not intended to replace advice given to you by your health care provider. Make sure you discuss any questions you have with your health care provider.

## 2023-04-28 NOTE — ED PROVIDER NOTE - PATIENT PORTAL LINK FT
You can access the FollowMyHealth Patient Portal offered by [facility] by registering at the following website: [email or web address]. By joining [employer]’s FollowMyHealth portal, you will also be able to view your health information using other applications (apps) compatible with our system.
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
## 2023-04-28 NOTE — ED PROVIDER NOTE - PHYSICAL EXAMINATION
GENERAL: Well-nourished, Well-developed. NAD.  HEAD: No visible or palpable bumps or hematomas. No ecchymosis behind ears B/L.  Eyes: PERRLA, EOMI. No asymmetry. No nystagmus. No conjunctival injection. Non-icteric sclera.  ENMT: MMM. no hemotympanum. TMs clear with good cone of light B/L.   Neck: Supple. FROM  [facility]: RRR  Skin: Warm, Dry. No rashes or lesions. Good cap refill < 2 sec B/L.  Neuro: AA&O x 3. CNs II-XII grossly intact. Speaking in full sentences. No slurring of speech. No facial droop. No tremors. Sensation grossly intact. Strength 5/5 B/L. Gait within normal limits.
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
## 2023-06-23 ENCOUNTER — OUTPATIENT (OUTPATIENT)
Dept: OUTPATIENT SERVICES | Facility: HOSPITAL | Age: 43
LOS: 1 days | End: 2023-06-23
Payer: MEDICAID

## 2023-06-23 ENCOUNTER — APPOINTMENT (OUTPATIENT)
Dept: PULMONOLOGY | Facility: HOSPITAL | Age: 43
End: 2023-06-23
Payer: MEDICAID

## 2023-06-23 DIAGNOSIS — Z90.49 ACQUIRED ABSENCE OF OTHER SPECIFIED PARTS OF DIGESTIVE TRACT: Chronic | ICD-10-CM

## 2023-06-23 DIAGNOSIS — R06.02 SHORTNESS OF BREATH: ICD-10-CM

## 2023-06-23 PROCEDURE — 94729 DIFFUSING CAPACITY: CPT

## 2023-06-23 PROCEDURE — 94726 PLETHYSMOGRAPHY LUNG VOLUMES: CPT | Mod: 26

## 2023-06-23 PROCEDURE — 94729 DIFFUSING CAPACITY: CPT | Mod: 26

## 2023-06-23 PROCEDURE — 94060 EVALUATION OF WHEEZING: CPT | Mod: 26

## 2023-06-23 PROCEDURE — 94726 PLETHYSMOGRAPHY LUNG VOLUMES: CPT

## 2023-06-23 PROCEDURE — 94070 EVALUATION OF WHEEZING: CPT

## 2023-06-24 DIAGNOSIS — R06.02 SHORTNESS OF BREATH: ICD-10-CM

## 2023-07-03 ENCOUNTER — APPOINTMENT (OUTPATIENT)
Dept: PULMONOLOGY | Facility: CLINIC | Age: 43
End: 2023-07-03

## 2024-02-23 NOTE — CONSULT NOTE ADULT - SUBJECTIVE AND OBJECTIVE BOX
CHUCHO LYN [id number]  38y Male      HPI: 38 yr old male with c/o increasing abdominal pain, which began yesterday after eating a large amount of leafy greens. Pain was associated with N/V; last BM was last night, after pain started (watery, small). He has hx of crohn's disease since childhood, followed at [facility]; he has had 2 bowel resections (at 17 and 27 yrs old) with SBO at approx. 28 yrs old (treated with NGT at [facility]).  No flatus/Bm today.  CT scan showed SBO; NGT placed by surgical team while in ER with 250 cc bilious output (he vomitted after CT was done)      Allergies    lactose (Hives)  No Known Drug Allergies      REVIEW OF SYSTEMS    [x ] A ten-point review of systems was otherwise negative except as noted.  [ ] Due to altered mental status/intubation, subjective information were not able to be obtained from the patient. History was obtained, to the extent possible, from review of the chart and collateral sources of information.      Vital Signs Last 24 Hrs  T(C): 36.4 (21 Dec 2018 12:31), Max: 36.4 (21 Dec 2018 11:53)  T(F): 97.5 (21 Dec 2018 12:31), Max: 97.5 (21 Dec 2018 11:53)  HR: 66 (21 Dec 2018 12:31) (66 - 76)  BP: 120/71 (21 Dec 2018 12:31) (120/71 - 145/93)  BP(mean): --  RR: 16 (21 Dec 2018 12:31) (16 - 17)  SpO2: 95% (21 Dec 2018 11:53) (95% - 97%)    PHYSICAL EXAM:  GENERAL: NAD, well-appearing  CHEST/LUNG: Clear to auscultation bilaterally  HEART: Regular rate and rhythm  ABDOMEN: Soft, mild tenderness and distention; no peritoneal signs       LABS:                        16.1   10.42 )-----------( 269      ( 21 Dec 2018 04:05 )             46.8         12-21    139  |  98  |  15  ----------------------------<  127<H>  4.6   |  24  |  0.9      Calcium, Total Serum: 10.3 mg/dL (12-21-18 @ 04:05)      LFTs:             7.8  | 1.1  | 23       ------------------[97      ( 21 Dec 2018 04:05 )  4.7  | <0.2 | 27          Lipase:19     Amylase:x         Lactate, Blood: 1.6 mmol/L (12-21-18 @ 04:05)      Coags:      RADIOLOGY & ADDITIONAL STUDIES:    CT Abdomen and Pelvis w/ Oral Cont and w/ IV Cont (12.21.18 @ 07:36) >  IMPRESSION:  Multiple dilated fluid-filled loops of small bowel with evidence of   fecalization measuring up to 4.2 cm within the mid abdomen with apparent   transition point in the right lower quadrant consistent with small bowel   obstruction.    Small abdominopelvic ascites.      Xray Chest 1 View-PORTABLE IMMEDIATE (12.21.18 @ 11:03) >  Findings:    Support devices: Recently placed enteric tube is in satisfactory   position, terminating overlying the expected region of the stomach in   left upper abdomen    Cardiac/mediastinum/hilum: Unchanged    Lung parenchyma/Pleura: There is no evidence of consolidation, effusion   or pneumothorax.    Skeleton/soft tissues: Stable    Impression:      No radiographic evidence of acute cardiopulmonary disease. 1-2 cups/cans per day

## 2024-04-03 ENCOUNTER — APPOINTMENT (OUTPATIENT)
Dept: NEUROLOGY | Facility: CLINIC | Age: 44
End: 2024-04-03
Payer: MEDICAID

## 2024-04-03 VITALS
WEIGHT: 186 LBS | SYSTOLIC BLOOD PRESSURE: 130 MMHG | HEART RATE: 72 BPM | HEIGHT: 70 IN | BODY MASS INDEX: 26.63 KG/M2 | DIASTOLIC BLOOD PRESSURE: 83 MMHG

## 2024-04-03 DIAGNOSIS — G56.02 CARPAL TUNNEL SYNDROME, LEFT UPPER LIMB: ICD-10-CM

## 2024-04-03 PROCEDURE — 99204 OFFICE O/P NEW MOD 45 MIN: CPT

## 2024-04-03 NOTE — ASSESSMENT
[FreeTextEntry1] : Impression is that of   Total clinician time spent today on the patient is 45 minutes including preparing to see the patient, obtaining and/or reviewing and confirming history, performing medically necessary and appropriate examination, counseling and educating the patient and/or family, documenting clinical information in the EHR and communicating and/or referring to other healthcare professionals.   Entered by Xochitl Lowe acting as scribe for Dr. Ball.   The documentation recorded by the scribe, in my presence, accurately reflects the service I personally performed, and the decisions made by me with my edits as appropriate. Jesus Ball MD, FAAN, FACP Diplomate American Board of Psychiatry & Neurology.

## 2024-04-08 ENCOUNTER — APPOINTMENT (OUTPATIENT)
Dept: ORTHOPEDIC SURGERY | Facility: CLINIC | Age: 44
End: 2024-04-08
Payer: MEDICAID

## 2024-04-08 DIAGNOSIS — G56.22 LESION OF ULNAR NERVE, LEFT UPPER LIMB: ICD-10-CM

## 2024-04-08 PROCEDURE — 73090 X-RAY EXAM OF FOREARM: CPT | Mod: LT

## 2024-04-08 PROCEDURE — 99202 OFFICE O/P NEW SF 15 MIN: CPT

## 2024-04-12 ENCOUNTER — APPOINTMENT (OUTPATIENT)
Dept: NEUROLOGY | Facility: CLINIC | Age: 44
End: 2024-04-12
Payer: MEDICAID

## 2024-04-12 PROCEDURE — 95912 NRV CNDJ TEST 11-12 STUDIES: CPT

## 2024-04-12 PROCEDURE — 95886 MUSC TEST DONE W/N TEST COMP: CPT

## 2024-04-25 ENCOUNTER — NON-APPOINTMENT (OUTPATIENT)
Age: 44
End: 2024-04-25

## 2024-05-01 ENCOUNTER — APPOINTMENT (OUTPATIENT)
Dept: NEUROLOGY | Facility: CLINIC | Age: 44
End: 2024-05-01

## 2024-06-10 ENCOUNTER — EMERGENCY (EMERGENCY)
Facility: HOSPITAL | Age: 44
LOS: 0 days | Discharge: HOME | End: 2024-06-10
Attending: STUDENT IN AN ORGANIZED HEALTH CARE EDUCATION/TRAINING PROGRAM
Payer: MEDICAID

## 2024-06-10 ENCOUNTER — APPOINTMENT (OUTPATIENT)
Dept: ORTHOPEDIC SURGERY | Facility: CLINIC | Age: 44
End: 2024-06-10
Payer: MEDICARE

## 2024-06-10 VITALS
DIASTOLIC BLOOD PRESSURE: 97 MMHG | OXYGEN SATURATION: 99 % | RESPIRATION RATE: 18 BRPM | SYSTOLIC BLOOD PRESSURE: 149 MMHG | TEMPERATURE: 98 F | WEIGHT: 186.95 LBS | HEART RATE: 79 BPM

## 2024-06-10 VITALS — WEIGHT: 187 LBS | HEIGHT: 70 IN | BODY MASS INDEX: 26.77 KG/M2

## 2024-06-10 DIAGNOSIS — M20.011 MALLET FINGER OF RIGHT FINGER(S): ICD-10-CM
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
## 2024-06-10 DIAGNOSIS — Z90.49 ACQUIRED ABSENCE OF OTHER SPECIFIED PARTS OF DIGESTIVE TRACT: Chronic | ICD-10-CM

## 2024-06-10 DIAGNOSIS — Z91.011 ALLERGY TO MILK PRODUCTS: ICD-10-CM
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
## 2024-06-10 PROCEDURE — 99213 OFFICE O/P EST LOW 20 MIN: CPT

## 2024-06-10 PROCEDURE — 73130 X-RAY EXAM OF HAND: CPT | Mod: 26,RT

## 2024-06-10 PROCEDURE — 99283 EMERGENCY DEPT VISIT LOW MDM: CPT

## 2024-06-10 PROCEDURE — 99284 EMERGENCY DEPT VISIT MOD MDM: CPT | Mod: 25

## 2024-06-10 PROCEDURE — 29130 APPL FINGER SPLINT STATIC: CPT | Mod: F9

## 2024-06-10 PROCEDURE — 73130 X-RAY EXAM OF HAND: CPT | Mod: RT

## 2024-06-10 NOTE — DISCUSSION/SUMMARY
[de-identified] : Chief complaint: Right pinky finger pain  HPI: Patient is a 43-year-old ambidextrous male who presents the office today for the evaluation of pain to the right pinky finger which manifested yesterday.  Patient does not recall any known fall, injury, or trauma.  He reports that he applied pressure to the distal phalanx of the right pinky while holding his hand in a flexed position after which time he felt a pop localized over the dorsal aspect of the right fifth DIP followed by numbness in the area.  After that time he reports that he was unable to extend at the DIP of the right fifth finger.  He presented to [facility] at which time x-rays of the right hand were performed which showed no acute fracture or acute articular abnormality as per the impression of the radiologist.  Patient reports that he was diagnosed with a mallet finger, was placed in a splint, and was discharged with instructions to follow-up with orthopedics.  ROS: Positive for right pinky finger pain  Physical examination of the right fifth finger:  Mild edema noted to diffuse finger No erythema No ecchymosis Skin is intact Patient is able to flex at the DIP, PIP, and MP joints Patient is able to extend at the MP, PIP He is unable to actively extend at the DIP joint Able to fully passively extend at the DIP joint No appreciable laxity with ulnar or radial stress testing at the DIP, PIP, or MP joints No appreciable tenderness over the metacarpal, MP, proximal phalanx, PIP Tenderness over the distal aspect of the middle phalanx, over the DIP joint, and over the proximal aspect of the distal phalanx Capillary refill less than 2 seconds Distal neurovascularly intact   Three-view x-rays of the right hand performed at [facility] on 6/10/2024 show no acute fracture or acute articular abnormality as per the radiologist's read  Assessment/plan: Right fifth digit mallet finger, discussed in detail treatment with the patient which includes splinting the DIP joint in extension for a full 6 weeks at all times, explained to the patient in detail that if the splint comes off and if his DIP joint flexes for even a millisecond that we will have to restart the splinting for a full 6 weeks, advised the patient that he should remain splinted at all times including sleeping and hygiene, explained to the patient that following a full 6 weeks of full-time splinting that he will have an additional 6 weeks of nighttime splinting, discussed with the patient that it is impossible to determine at this time the degree of return that he will get an extension of the DIP joint of the right fifth finger, patient verbalized understanding and reports that he will be compliant with the splinting, he can take over-the-counter oral NSAIDs and supplement with over-the-counter Tylenol for pain, patient will be provided with a 3-week follow-up with DIONY Aguilar, patient verbalized understanding of all findings in the office today, he agrees to follow-up as directed

## 2024-06-10 NOTE — ED PROVIDER NOTE - OBJECTIVE STATEMENT
43 year old male, no past medical history, who presents with right 5th finger injury. patient was attempting to reach to shake another individual's hand when he hyperextended the distal aspect of right 5th digit resulting in finger injury. patient now with unable to extend distal tip of right 5th digit. patient left hand dominant. denies numbness/weakness, hand swelling, skin changes.

## 2024-06-10 NOTE — ED PROVIDER NOTE - NSFOLLOWUPINSTRUCTIONS_ED_ALL_ED_FT
Please follow up with your primary care doctor and orthopedics in 1-3 days  Please be aware of any new or worsening signs or symptoms that should prompt your return to the ER.      Mallet Finger    A hand's anatomy showing extensor tendons.   Mallet finger is an injury that occurs when an object hits the tip of your straightened finger or thumb. It is also known as baseball finger. The blow to your fingertip causes it to bend more than normal, which tears the cord that attaches to the tip of your finger (extensor tendon).     Your extensor tendon is what straightens the end of your finger. If this tendon is damaged, you will not be able to straighten your fingertip. Sometimes, a piece of bone may be pulled away with the tendon (avulsion injury), or the tendon may tear completely. In some cases, surgery may be required to repair the damage.      What are the causes?  Mallet finger is caused by a hard, direct hit to the tip of your finger or thumb. This injury often happens from getting hit in the finger with a hard ball, such as a baseball.      What increases the risk?  This injury is more likely to happen if you play a sport that uses a hard ball.    What are the signs or symptoms?    The main symptom of this injury is the inability to straighten the tip of your finger. You can manually straighten your fingertip with your other hand, but the finger cannot straighten on its own.    Other symptoms may include:  •Pain.  •Swelling.  •Bruising.  •Blood under the fingernail.    How is this diagnosed?    Your health care provider may suspect mallet finger if you are not able to extend your fingertip, especially if you recently injured your hand. Your health care provider will do a physical exam. This may include X-rays to see if a piece of bone has been pulled away or if the finger joint has  (dislocated).    How is this treated?    Mallet finger may be treated with:  •A splint on your fingertip to keep it straight (extended) while the tendon heals.  •Surgery to repair the tendon. This is done in severe cases. This may involve:  •Using a pin or screw to keep your finger extended and your tendon attached.  •Using a piece of tendon from another part of your body (graft) to replace a torn tendon.    Follow these instructions at home:    If you have a removable splint:     •Wear the splint as told by your health care provider. Remove it only as told by your health care provider.  •Check the skin around the splint every day. Tell your health care provider about any concerns.  •Loosen the splint if your fingers tingle, become numb, or turn cold and blue.  •Keep the splint clean.  •If the splint is not waterproof:  •Do not let it get wet.  •Cover it with a watertight covering when you take a bath or a shower.  •If you remove your splint to dry it or change it:  •Gently press your finger on a flat surface to keep it straight. Failing to do so may lead to a permanent injury or force you to wear the splint for a longer period of time.  •Check the skin under the splint. Tell your health care provider if you notice a blister or red and raw skin.    Managing pain, stiffness, and swelling   Bag of ice on a towel on the skin. •If directed, put ice on the injured area. To do this:  •If you have a removable splint, remove it as told by your health care provider.  •Put ice in a plastic bag.  •Place a towel between your skin and the bag.  •Leave the ice on for 20 minutes, 2–3 times a day.  •Remove the ice if your skin turns bright red. This is very important. If you cannot feel pain, heat, or cold, you have a greater risk of damage to the area.  •Move your fingers often to reduce stiffness and swelling.  •Raise (elevate)the injured area above the level of your heart while you are sitting or lying down.    General instructions   •Take over-the-counter and prescription medicines only as told by your health care provider.  •Ask your health care provider if the medicine prescribed to you requires you to avoid driving or using machinery.   •Keep all follow-up visits. This is important.    Contact a health care provider if:  •You have pain or swelling that is getting worse.  •Your finger feels cold.  •You cannot extend your finger after treatment.  •You notice that the skin under the splint is red, raw, or has a blister.    Get help right away if:  •Even after loosening your splint, your finger is:  •Very red and swollen.  •White or blue.  •Numb or tingling.    Summary  •Mallet finger is an injury that occurs from a hard, direct hit to the tip of your finger or thumb.  •The blow to your fingertip causes it to bend more than normal, tearing the tendon that straightens the end of your finger. You cannot straighten your fingertip if this tendon is torn.  •This injury often happens from getting hit in the finger with a hard ball, such as a baseball.  •Treatment will depend on how severe the injury is. You may need to wear a splint to keep the finger straight while it heals. A more severe injury may require surgery to repair the tendon.    This information is not intended to replace advice given to you by your health care provider. Make sure you discuss any questions you have with your health care provider.

## 2024-06-10 NOTE — ED ADULT NURSE NOTE - NS_NURSE_DISC_TEACHING_YN_ED_ALL_ED
Prior Eulalio Johnson approval for livalo. Approval dates from 7/24/19-7/23/20. Approval faxed to [facility].
Yes

## 2024-06-10 NOTE — ED PROVIDER NOTE - CLINICAL SUMMARY MEDICAL DECISION MAKING FREE TEXT BOX
43 year old male, no past medical history, who presents with right 5th finger injury. patient was attempting to reach to shake another individual's hand when he hyperextended the distal aspect of right 5th digit resulting in finger injury. patient now with unable to extend distal tip of right 5th digit. patient left hand dominant. denies numbness/weakness, hand swelling, skin changes. xray no acute displaced fx, clinically pt has mallet finger, pt placed on finger splint and discharged with ortho/hand surgery follow up

## 2024-06-10 NOTE — ED PROVIDER NOTE - ATTENDING APP SHARED VISIT CONTRIBUTION OF CARE
I personally evaluated the patient. I reviewed the Resident’s or Physician Assistant’s note (as assigned above), and agree with the findings and plan except as documented in my note.  43 year old male, no past medical history, who presents with right 5th finger injury. patient was attempting to reach to shake another individual's hand when he hyperextended the distal aspect of right 5th digit resulting in finger injury. patient now with unable to extend distal tip of right 5th digit. patient left hand dominant. denies numbness/weakness, hand swelling, skin changes.  CONSTITUTIONAL: well developed; in no acute distress  HEAD: normocephalic; atraumatic  EYES: no conjunctival injection, no scleral icterus  ENT: no nasal discharge; airway clear.  NECK: supple; non tender.  CARD: warm and well perfused, not tachycardic  RESP: breathing comfortably on RA, speaking in full sentences w/o distress  Abdomen: Soft, None Tender, None Distended   EXT: moving all extremities spontaneously, there is flexion deformity of distal phalanx of the small finger on the right side   SKIN: warm and dry, no lesions noted  NEURO: alert, oriented, motor and sensory grossly intact, speech nonslurred  PSYCH: calm, cooperative  will send x ray and reevaluate

## 2024-06-10 NOTE — ED PROVIDER NOTE - PATIENT PORTAL LINK FT
You can access the FollowMyHealth Patient Portal offered by [facility] by registering at the following website: [email or web address]. By joining [employer]’s FollowMyHealth portal, you will also be able to view your health information using other applications (apps) compatible with our system.

## 2024-06-10 NOTE — ED ADULT NURSE NOTE - NSFALLUNIVINTERV_ED_ALL_ED
Bed/Stretcher in lowest position, wheels locked, appropriate side rails in place/Call bell, personal items and telephone in reach/Instruct patient to call for assistance before getting out of bed/chair/stretcher/Non-slip footwear applied when patient is off stretcher/[place] to call system/Physically safe environment - no spills, clutter or unnecessary equipment/Purposeful proactive rounding/Room/bathroom lighting operational, light cord in reach

## 2024-06-25 ENCOUNTER — NON-APPOINTMENT (OUTPATIENT)
Age: 44
End: 2024-06-25

## 2024-07-04 ENCOUNTER — NON-APPOINTMENT (OUTPATIENT)
Age: 44
End: 2024-07-04

## 2024-07-10 ENCOUNTER — APPOINTMENT (OUTPATIENT)
Dept: ORTHOPEDIC SURGERY | Facility: CLINIC | Age: 44
End: 2024-07-10
Payer: MEDICARE

## 2024-07-10 DIAGNOSIS — M20.011 MALLET FINGER OF RIGHT FINGER(S): ICD-10-CM

## 2024-07-10 PROCEDURE — 99213 OFFICE O/P EST LOW 20 MIN: CPT

## 2024-07-29 ENCOUNTER — APPOINTMENT (OUTPATIENT)
Dept: ORTHOPEDIC SURGERY | Facility: CLINIC | Age: 44
End: 2024-07-29
Payer: MEDICARE

## 2024-07-29 DIAGNOSIS — M20.011 MALLET FINGER OF RIGHT FINGER(S): ICD-10-CM

## 2024-07-29 PROCEDURE — 99213 OFFICE O/P EST LOW 20 MIN: CPT

## 2024-07-29 NOTE — DISCUSSION/SUMMARY
[de-identified] : He is now 6 weeks from the injury. He may convert to nighttime splinting. He understands that he may always have a mild lag compared to the other hand.  On exam he can flex and extend at the DIP joint.  He is concerned about this. He will see Dr. Biswas in 6 weeks for repeat evaluation. All questions were answered today.

## 2024-07-29 NOTE — HISTORY OF PRESENT ILLNESS
[de-identified] : Patient is a 43-year-old male here for evaluation of right pinky finger.  He is about 6 weeks status post a mallet finger.  Overall he is doing well. He's been compliant with the splint.

## 2024-07-29 NOTE — PHYSICAL EXAM
[de-identified] : Physical exam of his right pinky finger: Mild swelling.  No ecchymosis.  Tenderness over the DIP joint.  There is a very mild extensor lag.  He can flex and extend at the MCP, PIP, and DIP joint.  Sensory and motor are intact.

## 2024-07-29 NOTE — DISCUSSION/SUMMARY
[de-identified] : He is now 6 weeks from the injury. He may convert to nighttime splinting. He understands that he may always have a mild lag compared to the other hand.  On exam he can flex and extend at the DIP joint.  He is concerned about this. He will see Dr. Biswas in 6 weeks for repeat evaluation. All questions were answered today.

## 2024-07-29 NOTE — HISTORY OF PRESENT ILLNESS
[de-identified] : Patient is a 43-year-old male here for evaluation of right pinky finger.  He is about 6 weeks status post a mallet finger.  Overall he is doing well. He's been compliant with the splint.

## 2024-07-29 NOTE — PHYSICAL EXAM
[de-identified] : Physical exam of his right pinky finger: Mild swelling.  No ecchymosis.  Tenderness over the DIP joint.  There is a very mild extensor lag.  He can flex and extend at the MCP, PIP, and DIP joint.  Sensory and motor are intact.

## 2024-09-17 ENCOUNTER — APPOINTMENT (OUTPATIENT)
Dept: ORTHOPEDIC SURGERY | Facility: CLINIC | Age: 44
End: 2024-09-17

## 2024-09-28 NOTE — ED PROVIDER NOTE - PHYSICAL EXAMINATION
CONSTITUTIONAL: non-toxic appearing male, nad  SKIN: skin exam is warm and dry  HEAD: Normocephalic; atraumatic  NECK: ROM intact.  EXT: RUE: DIP of R 5th digit in flexion. pulses 2+  NEURO: awake, alert, following commands, oriented, grossly unremarkable. No Focal deficits. GCS 15.   PSYCH: Cooperative, appropriate. 4 = No assist / stand by assistance

## 2024-10-29 ENCOUNTER — APPOINTMENT (OUTPATIENT)
Dept: ORTHOPEDIC SURGERY | Facility: CLINIC | Age: 44
End: 2024-10-29

## 2024-11-21 NOTE — HISTORY OF PRESENT ILLNESS
[FreeTextEntry1] : Mr. Tsai is a 43yo M with PMHx of UC/Crohn's? on remicade, superficial thrombophlebitis who presents to establish care. His PMD is Dr. Gladys Henderson.  Admission